# Patient Record
Sex: FEMALE | Race: WHITE | NOT HISPANIC OR LATINO | Employment: FULL TIME | URBAN - METROPOLITAN AREA
[De-identification: names, ages, dates, MRNs, and addresses within clinical notes are randomized per-mention and may not be internally consistent; named-entity substitution may affect disease eponyms.]

---

## 2018-01-17 ENCOUNTER — OFFICE VISIT (OUTPATIENT)
Dept: URGENT CARE | Facility: PHYSICIAN GROUP | Age: 53
End: 2018-01-17
Payer: COMMERCIAL

## 2018-01-17 ENCOUNTER — HOSPITAL ENCOUNTER (OUTPATIENT)
Dept: RADIOLOGY | Facility: MEDICAL CENTER | Age: 53
End: 2018-01-17
Attending: FAMILY MEDICINE
Payer: COMMERCIAL

## 2018-01-17 VITALS
SYSTOLIC BLOOD PRESSURE: 132 MMHG | RESPIRATION RATE: 18 BRPM | BODY MASS INDEX: 35.67 KG/M2 | OXYGEN SATURATION: 99 % | HEART RATE: 84 BPM | TEMPERATURE: 98.6 F | DIASTOLIC BLOOD PRESSURE: 100 MMHG | WEIGHT: 195 LBS

## 2018-01-17 DIAGNOSIS — E11.9 DIABETES MELLITUS TYPE 2 IN NONOBESE (HCC): ICD-10-CM

## 2018-01-17 DIAGNOSIS — I10 ESSENTIAL HYPERTENSION: ICD-10-CM

## 2018-01-17 DIAGNOSIS — M53.9 MULTILEVEL DEGENERATIVE DISC DISEASE: ICD-10-CM

## 2018-01-17 DIAGNOSIS — M54.12 RADICULITIS, CERVICAL: ICD-10-CM

## 2018-01-17 PROCEDURE — 72040 X-RAY EXAM NECK SPINE 2-3 VW: CPT

## 2018-01-17 PROCEDURE — 99204 OFFICE O/P NEW MOD 45 MIN: CPT | Performed by: FAMILY MEDICINE

## 2018-01-17 RX ORDER — HYDROCODONE BITARTRATE AND ACETAMINOPHEN 5; 325 MG/1; MG/1
1 TABLET ORAL EVERY 8 HOURS PRN
Qty: 15 TAB | Refills: 0 | Status: SHIPPED | OUTPATIENT
Start: 2018-01-17 | End: 2018-01-22

## 2018-01-17 RX ORDER — METHYLPREDNISOLONE 4 MG/1
TABLET ORAL
Qty: 21 TAB | Refills: 0 | Status: SHIPPED | OUTPATIENT
Start: 2018-01-17 | End: 2019-10-05

## 2018-01-17 ASSESSMENT — PAIN SCALES - GENERAL: PAINLEVEL: 9=SEVERE PAIN

## 2018-01-18 NOTE — PROGRESS NOTES
Subjective:      Rosalba Méndez is a 53 y.o. female who presents with Other (nerve pain radiating on Rt side x 4 days )    Very pleasant patient presents to urgent care with acute onset of 4 days right sided neck pain that radiates down to her right shoulder region she feels numbness in her right thumb. Denies any trauma to the area. States that she is a  is right handed. He often holds her hand and arm in a stressed position. She also adds that she has had some increase activity and stress at her house. May not be sleeping as well as she normally does. Denies any fevers or chills. No skin rashes. She's been taking some ibuprofen with minimal relief in symptoms.    She has a history of diabetes and hypertension which is currently stable per patient.  She denies history of diabetic neuropathy.    HPI  ROS  PMH:  has a past medical history of Diabetes and Hypertension.  MEDS:   Current Outpatient Prescriptions:   •  ampicillin (PRINCIPEN) 500 MG CAPS, Take 1 Cap by mouth every 6 hours., Disp: 40 Cap, Rfl: 0  •  ibuprofen (MOTRIN) 800 MG TABS, Take 800 mg by mouth every 8 hours as needed., Disp: , Rfl:   •  hydrocodone-acetaminophen (NORCO) 5-325 MG TABS per tablet, Take 1 Tab by mouth every 6 hours as needed., Disp: 20 Tab, Rfl: 0  •  METFORMIN HCL PO, Take  by mouth., Disp: , Rfl:   •  NON SPECIFIED, HTN (lisinopril), Disp: , Rfl:   ALLERGIES: No Known Allergies  SURGHX:   Past Surgical History:   Procedure Laterality Date   • OTHER ABDOMINAL SURGERY      cholecystectomy     SOCHX:  reports that she has never smoked. She has never used smokeless tobacco. She reports that she does not drink alcohol or use drugs.  FH: Family history was reviewed, no pertinent findings to report     Objective:     /100   Pulse 84   Temp 37 °C (98.6 °F)   Resp 18   Wt 88.5 kg (195 lb)   SpO2 99%   BMI 35.67 kg/m²      Physical Exam   Constitutional: She is oriented to person, place, and time. She appears  well-developed and well-nourished. No distress.   HENT:   Mouth/Throat: Oropharynx is clear and moist.   Eyes: Conjunctivae are normal.   Neck: Normal range of motion.   Cardiovascular: Normal rate.    Pulmonary/Chest: Effort normal.   Musculoskeletal: Normal range of motion. She exhibits tenderness. She exhibits no edema or deformity.        Cervical back: She exhibits tenderness, pain and spasm. She exhibits normal range of motion, no bony tenderness and normal pulse.        Back:         Arms:  Lymphadenopathy:     She has no cervical adenopathy.   Neurological: She is alert and oriented to person, place, and time. She exhibits normal muscle tone.   Skin: Skin is warm and dry. She is not diaphoretic. No erythema.   Psychiatric: She has a normal mood and affect. Her behavior is normal.       Diagnostics: C-spine x-ray multilevel degenerative disc      Assessment/Plan:     1. Radiculitis, cervical  DX-CERVICAL SPINE-2 OR 3 VIEWS    hydrocodone-acetaminophen (NORCO) 5-325 MG Tab per tablet    CONSENT FOR OPIATE PRESCRIPTION    MethylPREDNISolone (MEDROL DOSEPAK) 4 MG Tablet Therapy Pack   2. Multilevel degenerative disc disease  MethylPREDNISolone (MEDROL DOSEPAK) 4 MG Tablet Therapy Pack   3. Essential hypertension     4. Diabetes mellitus type 2 in nonobese (CMS-[facility])     Continue chronic medications these issues are stable per patient  Follow-up with primary care provider within 4-5 days, emergency room precautions discussed.  Patient and/or family appears understanding of information.      Follow-up with primary care provider within 4-5 days, emergency room precautions discussed.  Patient and/or family appears understanding of information.

## 2018-04-23 ENCOUNTER — OFFICE VISIT (OUTPATIENT)
Dept: URGENT CARE | Facility: PHYSICIAN GROUP | Age: 53
End: 2018-04-23
Payer: COMMERCIAL

## 2018-04-23 VITALS
WEIGHT: 195 LBS | HEIGHT: 63 IN | RESPIRATION RATE: 14 BRPM | HEART RATE: 84 BPM | BODY MASS INDEX: 34.55 KG/M2 | TEMPERATURE: 99.3 F | SYSTOLIC BLOOD PRESSURE: 134 MMHG | OXYGEN SATURATION: 95 % | DIASTOLIC BLOOD PRESSURE: 80 MMHG

## 2018-04-23 DIAGNOSIS — J10.1 INFLUENZA B: ICD-10-CM

## 2018-04-23 LAB
FLUAV+FLUBV AG SPEC QL IA: POSITIVE
INT CON NEG: NEGATIVE
INT CON POS: POSITIVE

## 2018-04-23 PROCEDURE — 87804 INFLUENZA ASSAY W/OPTIC: CPT | Performed by: FAMILY MEDICINE

## 2018-04-23 PROCEDURE — 99214 OFFICE O/P EST MOD 30 MIN: CPT | Performed by: FAMILY MEDICINE

## 2018-04-23 RX ORDER — LISINOPRIL 10 MG/1
10 TABLET ORAL DAILY
COMMUNITY
End: 2020-10-16 | Stop reason: SDUPTHER

## 2018-04-23 RX ORDER — OSELTAMIVIR PHOSPHATE 75 MG/1
75 CAPSULE ORAL 2 TIMES DAILY
Qty: 10 CAP | Refills: 0 | Status: SHIPPED | OUTPATIENT
Start: 2018-04-23 | End: 2019-10-05

## 2018-04-23 ASSESSMENT — PAIN SCALES - GENERAL: PAINLEVEL: NO PAIN

## 2018-04-30 ASSESSMENT — ENCOUNTER SYMPTOMS
FEVER: 1
CHILLS: 1
MYALGIAS: 1
SWOLLEN GLANDS: 1

## 2018-05-01 NOTE — PROGRESS NOTES
"Subjective:   Rosalba Méndez is a 53 y.o. female who presents for Flu Like Symptoms (x 3 days)        Influenza   This is a new problem. The current episode started in the past 7 days. The problem occurs constantly. The problem has been rapidly worsening. Associated symptoms include chills, a fever, myalgias and swollen glands.     Review of Systems   Constitutional: Positive for chills and fever.   Musculoskeletal: Positive for myalgias.     No Known Allergies   Objective:   /80   Pulse 84   Temp 37.4 °C (99.3 °F)   Resp 14   Ht 1.6 m (5' 3\")   Wt 88.5 kg (195 lb)   SpO2 95%   BMI 34.54 kg/m²   Physical Exam   Constitutional: She is oriented to person, place, and time. She appears well-developed and well-nourished. No distress.   HENT:   Head: Normocephalic and atraumatic.   Right Ear: External ear normal.   Left Ear: External ear normal.   Nose: Mucosal edema and rhinorrhea present. No sinus tenderness. Right sinus exhibits no maxillary sinus tenderness and no frontal sinus tenderness. Left sinus exhibits no maxillary sinus tenderness and no frontal sinus tenderness.   Mouth/Throat: Uvula is midline. Posterior oropharyngeal edema and posterior oropharyngeal erythema present. No oropharyngeal exudate or tonsillar abscesses.   Eyes: Conjunctivae and EOM are normal. Pupils are equal, round, and reactive to light.   Cardiovascular: Normal rate, regular rhythm, normal heart sounds and intact distal pulses.    No murmur heard.  Pulmonary/Chest: Effort normal and breath sounds normal. No respiratory distress.   Abdominal: Soft. Bowel sounds are normal. She exhibits no distension. There is no tenderness.   Musculoskeletal: Normal range of motion.   Neurological: She is alert and oriented to person, place, and time. She has normal reflexes. No sensory deficit.   Skin: Skin is warm and dry.   Psychiatric: She has a normal mood and affect. Her behavior is normal.         Assessment/Plan:   Assessment    1. " Influenza B  - oseltamivir (TAMIFLU) 75 MG Cap; Take 1 Cap by mouth 2 times a day.  Dispense: 10 Cap; Refill: 0  - POCT Influenza A/B  Differential diagnosis, natural history, supportive care, and indications for immediate follow-up discussed.

## 2018-06-24 ENCOUNTER — OFFICE VISIT (OUTPATIENT)
Dept: URGENT CARE | Facility: PHYSICIAN GROUP | Age: 53
End: 2018-06-24
Payer: COMMERCIAL

## 2018-06-24 VITALS
SYSTOLIC BLOOD PRESSURE: 142 MMHG | HEIGHT: 63 IN | HEART RATE: 100 BPM | DIASTOLIC BLOOD PRESSURE: 86 MMHG | OXYGEN SATURATION: 97 % | TEMPERATURE: 98.9 F | WEIGHT: 192 LBS | RESPIRATION RATE: 16 BRPM | BODY MASS INDEX: 34.02 KG/M2

## 2018-06-24 DIAGNOSIS — R30.0 DYSURIA: ICD-10-CM

## 2018-06-24 DIAGNOSIS — R11.0 NAUSEA: ICD-10-CM

## 2018-06-24 LAB
APPEARANCE UR: ABNORMAL
BILIRUB UR STRIP-MCNC: ABNORMAL MG/DL
COLOR UR AUTO: ABNORMAL
GLUCOSE UR STRIP.AUTO-MCNC: NEGATIVE MG/DL
KETONES UR STRIP.AUTO-MCNC: NEGATIVE MG/DL
LEUKOCYTE ESTERASE UR QL STRIP.AUTO: NEGATIVE
NITRITE UR QL STRIP.AUTO: NEGATIVE
PH UR STRIP.AUTO: 6 [PH] (ref 5–8)
PROT UR QL STRIP: 100 MG/DL
RBC UR QL AUTO: ABNORMAL
SP GR UR STRIP.AUTO: 1.03
UROBILINOGEN UR STRIP-MCNC: NEGATIVE MG/DL

## 2018-06-24 PROCEDURE — 81002 URINALYSIS NONAUTO W/O SCOPE: CPT | Performed by: FAMILY MEDICINE

## 2018-06-24 PROCEDURE — 99214 OFFICE O/P EST MOD 30 MIN: CPT | Performed by: FAMILY MEDICINE

## 2018-06-24 RX ORDER — ONDANSETRON 4 MG/1
4 TABLET, ORALLY DISINTEGRATING ORAL EVERY 8 HOURS PRN
Qty: 10 TAB | Refills: 0 | Status: SHIPPED | OUTPATIENT
Start: 2018-06-24 | End: 2019-10-05

## 2018-06-28 ASSESSMENT — ENCOUNTER SYMPTOMS
MYALGIAS: 0
EYE DISCHARGE: 0
CHILLS: 0
EYE REDNESS: 0
FEVER: 0

## 2018-06-28 NOTE — PROGRESS NOTES
"Subjective:      Rosalba Méndez is a 53 y.o. female who presents with Urinary Pain (C/o dysuria x2 wks)            2 week urinary burning and urgency. Today with nausea/vomiting and concern for UTI. No fever. No abdominal pain. No diarrhea. No recent travel. No hematuria or flank pain. Sx's are moderate severity. No OTC medications. No other aggravating or alleviating factors.          Review of Systems   Constitutional: Negative for chills and fever.   Eyes: Negative for discharge and redness.   Musculoskeletal: Negative for joint pain and myalgias.   Skin: Negative for itching and rash.          Objective:     /86   Pulse 100   Temp 37.2 °C (98.9 °F)   Resp 16   Ht 1.6 m (5' 3\")   Wt 87.1 kg (192 lb)   SpO2 97%   BMI 34.01 kg/m²      Physical Exam   Constitutional: She appears well-developed and well-nourished. No distress.   HENT:   Head: Normocephalic and atraumatic.   Cardiovascular: Normal rate, regular rhythm and normal heart sounds.    No murmur heard.  Pulmonary/Chest: Effort normal and breath sounds normal.   Abdominal: Soft. Bowel sounds are normal. There is no tenderness.   Genitourinary:   Genitourinary Comments: No cvat   Neurological:   Speech is clear. Patient is appropriate and cooperative.     Skin: Skin is warm and dry. No rash noted.               Assessment/Plan:   UA unremarkable    1. Nausea  ondansetron (ZOFRAN ODT) 4 MG TABLET DISPERSIBLE    POCT Urinalysis   2. Dysuria       Differential diagnosis, natural history, supportive care, and indications for immediate follow-up discussed at length.         "

## 2019-03-28 ENCOUNTER — OFFICE VISIT (OUTPATIENT)
Dept: URGENT CARE | Facility: PHYSICIAN GROUP | Age: 54
End: 2019-03-28
Payer: COMMERCIAL

## 2019-03-28 VITALS
DIASTOLIC BLOOD PRESSURE: 68 MMHG | HEIGHT: 63 IN | SYSTOLIC BLOOD PRESSURE: 130 MMHG | OXYGEN SATURATION: 96 % | RESPIRATION RATE: 14 BRPM | BODY MASS INDEX: 34.55 KG/M2 | WEIGHT: 195 LBS | HEART RATE: 93 BPM | TEMPERATURE: 98.1 F

## 2019-03-28 DIAGNOSIS — H60.392 OTHER INFECTIVE ACUTE OTITIS EXTERNA OF LEFT EAR: ICD-10-CM

## 2019-03-28 PROCEDURE — 99214 OFFICE O/P EST MOD 30 MIN: CPT | Performed by: FAMILY MEDICINE

## 2019-03-28 RX ORDER — BENZONATATE 200 MG/1
200 CAPSULE ORAL 3 TIMES DAILY PRN
Qty: 45 CAP | Refills: 0 | Status: SHIPPED | OUTPATIENT
Start: 2019-03-28 | End: 2019-10-05

## 2019-03-28 RX ORDER — CIPROFLOXACIN AND DEXAMETHASONE 3; 1 MG/ML; MG/ML
4 SUSPENSION/ DROPS AURICULAR (OTIC) 2 TIMES DAILY
Qty: 1 BOTTLE | Refills: 0 | Status: SHIPPED | OUTPATIENT
Start: 2019-03-28 | End: 2019-10-05

## 2019-03-28 RX ORDER — FLUTICASONE PROPIONATE 50 MCG
1 SPRAY, SUSPENSION (ML) NASAL DAILY
Qty: 1 BOTTLE | Refills: 0 | Status: SHIPPED | OUTPATIENT
Start: 2019-03-28 | End: 2019-10-05

## 2019-03-28 NOTE — PROGRESS NOTES
"Subjective:      CC: Otalgia -  ear pain            Otalgia - left  ear  This is a new problem. The current episode started yesterday. The problem occurs constantly. The problem has been unchanged. Associated symptoms include: productive cough, nasal congestion, serous discharge. Pertinent negatives include no abdominal pain, chest pain, chills, fever, headaches, joint swelling, myalgias, nausea, neck pain, rash or visual change. Nothing aggravates the symptoms. She has tried nothing for the symptoms.     Social hx - denies tobacco, alcohol, drug use      Past Medical History:   Diagnosis Date   • Diabetes    • Hypertension        Review of Systems   Constitutional: Negative for fever and chills.   HENT:   Negative for hearing loss and tinnitus.    Respiratory: + cough. Negative for hemoptysis, shortness of breath and wheezing.    Cardiovascular: Negative for chest pain, palpitations and leg swelling.   Gastrointestinal: Negative for nausea and abdominal pain.   Musculoskeletal: Negative for myalgias, joint swelling and neck pain.   Skin: Negative for rash.   Neurological: Negative for headaches.   All other systems reviewed and are negative.         Objective:     Blood pressure 130/68, pulse 93, temperature 36.7 °C (98.1 °F), temperature source Temporal, resp. rate 14, height 1.6 m (5' 3\"), weight 88.5 kg (195 lb), SpO2 96 %.      Physical Exam   Constitutional: Vital signs are normal. She is active. No distress.   HENT:   Head: There is normal jaw occlusion.   Left Ear:  There is pain upon palpation of the tragus.  There is erythema, edema, and narrowing of the external auditory canal.    No discharge noted  Left Ear: External ear normal. Tympanic membrane is normal  Nose:  Boggy mucosa.   + postnasal drip noted.   Mouth/Throat: Mucous membranes are moist. No oral lesions.  No erythema. No oropharyngeal exudate, pharynx swelling or pharynx petechiae. No  exudate.   Eyes: Conjunctivae and EOM are normal. Pupils " are equal, round, and reactive to light. Right eye exhibits no discharge. Left eye exhibits no discharge.   Neck: Normal range of motion. Neck supple.  No adenopathy  Cardiovascular: Normal rate and regular rhythm.  Pulses are palpable.    No murmur heard.  Pulmonary/Chest: Effort normal and breath sounds normal. There is normal air entry. No respiratory distress. no wheezes, rhonchi,  retraction.   Musculoskeletal:   no edema.   Neurological: A/O x 3.   CN 2-12 intact   Skin: Skin is warm. Capillary refill takes less than 3 seconds. No purpura and no rash noted. Patient is not diaphoretic. No jaundice or pallor.   Nursing note and vitals reviewed.              Assessment/Plan:       1. Other infective acute otitis externa of left ear     - ciprofloxacin/dexamethasone (CIPRODEX) 0.3-0.1 % Suspension; Place 4 Drops in ear 2 times a day.  Dispense: 1 Bottle; Refill: 0  - fluticasone (FLONASE) 50 MCG/ACT nasal spray; Spray 1 Spray in nose every day.  Dispense: 1 Bottle; Refill: 0  - benzonatate (TESSALON) 200 MG capsule; Take 1 Cap by mouth 3 times a day as needed for Cough.  Dispense: 45 Cap; Refill: 0      Follow up in one week if no improvement, sooner if symptoms worsen.

## 2019-03-29 ENCOUNTER — TELEPHONE (OUTPATIENT)
Dept: URGENT CARE | Facility: PHYSICIAN GROUP | Age: 54
End: 2019-03-29

## 2019-03-29 NOTE — TELEPHONE ENCOUNTER
----- Message from Deepa Casas, Med Ass't sent at 3/29/2019  8:52 AM PDT -----  Regarding: Telephone Encounter  Contact: [phone]  You saw PT yesterday 3/28 and prescribed her ciprofloxacin/dexamethasone (CIPRODEX) 0.3-0.1 % Suspension    Her insurance needs more information in order to approve the medication. Please call her at your earliest convenience as her condition has worsened.  Thank you

## 2019-10-05 ENCOUNTER — HOSPITAL ENCOUNTER (EMERGENCY)
Facility: MEDICAL CENTER | Age: 54
End: 2019-10-05
Attending: EMERGENCY MEDICINE
Payer: COMMERCIAL

## 2019-10-05 ENCOUNTER — APPOINTMENT (OUTPATIENT)
Dept: RADIOLOGY | Facility: MEDICAL CENTER | Age: 54
End: 2019-10-05
Attending: EMERGENCY MEDICINE
Payer: COMMERCIAL

## 2019-10-05 VITALS
HEIGHT: 63 IN | SYSTOLIC BLOOD PRESSURE: 140 MMHG | OXYGEN SATURATION: 96 % | WEIGHT: 196.21 LBS | RESPIRATION RATE: 19 BRPM | HEART RATE: 60 BPM | DIASTOLIC BLOOD PRESSURE: 71 MMHG | TEMPERATURE: 98.2 F | BODY MASS INDEX: 34.77 KG/M2

## 2019-10-05 DIAGNOSIS — N20.0 KIDNEY STONE: ICD-10-CM

## 2019-10-05 LAB
ALBUMIN SERPL BCP-MCNC: 4.2 G/DL (ref 3.2–4.9)
ALBUMIN/GLOB SERPL: 1.3 G/DL
ALP SERPL-CCNC: 91 U/L (ref 30–99)
ALT SERPL-CCNC: 20 U/L (ref 2–50)
ANION GAP SERPL CALC-SCNC: 16 MMOL/L (ref 0–11.9)
APPEARANCE UR: ABNORMAL
AST SERPL-CCNC: 15 U/L (ref 12–45)
BACTERIA #/AREA URNS HPF: ABNORMAL /HPF
BASOPHILS # BLD AUTO: 0.7 % (ref 0–1.8)
BASOPHILS # BLD: 0.06 K/UL (ref 0–0.12)
BILIRUB SERPL-MCNC: 0.2 MG/DL (ref 0.1–1.5)
BILIRUB UR QL STRIP.AUTO: NEGATIVE
BUN SERPL-MCNC: 14 MG/DL (ref 8–22)
CALCIUM SERPL-MCNC: 9.3 MG/DL (ref 8.4–10.2)
CHLORIDE SERPL-SCNC: 101 MMOL/L (ref 96–112)
CO2 SERPL-SCNC: 21 MMOL/L (ref 20–33)
COLOR UR: YELLOW
CREAT SERPL-MCNC: 0.75 MG/DL (ref 0.5–1.4)
EOSINOPHIL # BLD AUTO: 0.23 K/UL (ref 0–0.51)
EOSINOPHIL NFR BLD: 2.8 % (ref 0–6.9)
EPI CELLS #/AREA URNS HPF: ABNORMAL /HPF
ERYTHROCYTE [DISTWIDTH] IN BLOOD BY AUTOMATED COUNT: 37.2 FL (ref 35.9–50)
GLOBULIN SER CALC-MCNC: 3.3 G/DL (ref 1.9–3.5)
GLUCOSE SERPL-MCNC: 419 MG/DL (ref 65–99)
GLUCOSE UR STRIP.AUTO-MCNC: >=1000 MG/DL
HCG SERPL QL: NEGATIVE
HCT VFR BLD AUTO: 44 % (ref 37–47)
HGB BLD-MCNC: 14.2 G/DL (ref 12–16)
IMM GRANULOCYTES # BLD AUTO: 0.05 K/UL (ref 0–0.11)
IMM GRANULOCYTES NFR BLD AUTO: 0.6 % (ref 0–0.9)
KETONES UR STRIP.AUTO-MCNC: NEGATIVE MG/DL
LEUKOCYTE ESTERASE UR QL STRIP.AUTO: NEGATIVE
LIPASE SERPL-CCNC: 41 U/L (ref 7–58)
LYMPHOCYTES # BLD AUTO: 1.36 K/UL (ref 1–4.8)
LYMPHOCYTES NFR BLD: 16.3 % (ref 22–41)
MCH RBC QN AUTO: 26.6 PG (ref 27–33)
MCHC RBC AUTO-ENTMCNC: 32.3 G/DL (ref 33.6–35)
MCV RBC AUTO: 82.4 FL (ref 81.4–97.8)
MICRO URNS: ABNORMAL
MONOCYTES # BLD AUTO: 0.4 K/UL (ref 0–0.85)
MONOCYTES NFR BLD AUTO: 4.8 % (ref 0–13.4)
NEUTROPHILS # BLD AUTO: 6.25 K/UL (ref 2–7.15)
NEUTROPHILS NFR BLD: 74.8 % (ref 44–72)
NITRITE UR QL STRIP.AUTO: NEGATIVE
NRBC # BLD AUTO: 0 K/UL
NRBC BLD-RTO: 0 /100 WBC
PH UR STRIP.AUTO: 5 [PH] (ref 5–8)
PLATELET # BLD AUTO: 212 K/UL (ref 164–446)
PMV BLD AUTO: 10.6 FL (ref 9–12.9)
POTASSIUM SERPL-SCNC: 4.4 MMOL/L (ref 3.6–5.5)
PROT SERPL-MCNC: 7.5 G/DL (ref 6–8.2)
PROT UR QL STRIP: NEGATIVE MG/DL
RBC # BLD AUTO: 5.34 M/UL (ref 4.2–5.4)
RBC # URNS HPF: >150 /HPF
RBC UR QL AUTO: ABNORMAL
SODIUM SERPL-SCNC: 138 MMOL/L (ref 135–145)
SP GR UR STRIP.AUTO: 1.01
WBC # BLD AUTO: 8.4 K/UL (ref 4.8–10.8)
WBC #/AREA URNS HPF: ABNORMAL /HPF

## 2019-10-05 PROCEDURE — 85025 COMPLETE CBC W/AUTO DIFF WBC: CPT

## 2019-10-05 PROCEDURE — 83690 ASSAY OF LIPASE: CPT

## 2019-10-05 PROCEDURE — 81001 URINALYSIS AUTO W/SCOPE: CPT

## 2019-10-05 PROCEDURE — 700111 HCHG RX REV CODE 636 W/ 250 OVERRIDE (IP): Performed by: EMERGENCY MEDICINE

## 2019-10-05 PROCEDURE — 99285 EMERGENCY DEPT VISIT HI MDM: CPT

## 2019-10-05 PROCEDURE — 36415 COLL VENOUS BLD VENIPUNCTURE: CPT

## 2019-10-05 PROCEDURE — 80053 COMPREHEN METABOLIC PANEL: CPT

## 2019-10-05 PROCEDURE — 74176 CT ABD & PELVIS W/O CONTRAST: CPT

## 2019-10-05 PROCEDURE — 96361 HYDRATE IV INFUSION ADD-ON: CPT

## 2019-10-05 PROCEDURE — 84703 CHORIONIC GONADOTROPIN ASSAY: CPT

## 2019-10-05 PROCEDURE — 96374 THER/PROPH/DIAG INJ IV PUSH: CPT

## 2019-10-05 PROCEDURE — 700105 HCHG RX REV CODE 258: Performed by: EMERGENCY MEDICINE

## 2019-10-05 PROCEDURE — 96375 TX/PRO/DX INJ NEW DRUG ADDON: CPT

## 2019-10-05 RX ORDER — KETOROLAC TROMETHAMINE 30 MG/ML
15 INJECTION, SOLUTION INTRAMUSCULAR; INTRAVENOUS ONCE
Status: COMPLETED | OUTPATIENT
Start: 2019-10-05 | End: 2019-10-05

## 2019-10-05 RX ORDER — ONDANSETRON 4 MG/1
4 TABLET, ORALLY DISINTEGRATING ORAL EVERY 6 HOURS PRN
Qty: 20 TAB | Refills: 0 | Status: SHIPPED | OUTPATIENT
Start: 2019-10-05 | End: 2020-02-28

## 2019-10-05 RX ORDER — ONDANSETRON 2 MG/ML
4 INJECTION INTRAMUSCULAR; INTRAVENOUS ONCE
Status: COMPLETED | OUTPATIENT
Start: 2019-10-05 | End: 2019-10-05

## 2019-10-05 RX ORDER — ACETAMINOPHEN 500 MG
1000 TABLET ORAL DAILY
COMMUNITY

## 2019-10-05 RX ORDER — GLIMEPIRIDE 4 MG/1
4 TABLET ORAL DAILY
Refills: 1 | Status: SHIPPED | COMMUNITY
Start: 2019-07-29 | End: 2020-01-16

## 2019-10-05 RX ORDER — HYDROCODONE BITARTRATE AND ACETAMINOPHEN 5; 325 MG/1; MG/1
1-2 TABLET ORAL EVERY 6 HOURS PRN
Qty: 15 TAB | Refills: 0 | Status: SHIPPED | OUTPATIENT
Start: 2019-10-05 | End: 2019-10-08

## 2019-10-05 RX ORDER — SODIUM CHLORIDE, SODIUM LACTATE, POTASSIUM CHLORIDE, CALCIUM CHLORIDE 600; 310; 30; 20 MG/100ML; MG/100ML; MG/100ML; MG/100ML
1000 INJECTION, SOLUTION INTRAVENOUS ONCE
Status: COMPLETED | OUTPATIENT
Start: 2019-10-05 | End: 2019-10-05

## 2019-10-05 RX ORDER — IBUPROFEN 600 MG/1
600 TABLET ORAL EVERY 6 HOURS PRN
Qty: 30 TAB | Refills: 0 | Status: SHIPPED | OUTPATIENT
Start: 2019-10-05 | End: 2020-02-28

## 2019-10-05 RX ADMIN — ONDANSETRON 4 MG: 2 INJECTION INTRAMUSCULAR; INTRAVENOUS at 10:57

## 2019-10-05 RX ADMIN — KETOROLAC TROMETHAMINE 15 MG: 30 INJECTION, SOLUTION INTRAMUSCULAR at 10:57

## 2019-10-05 RX ADMIN — SODIUM CHLORIDE, POTASSIUM CHLORIDE, SODIUM LACTATE AND CALCIUM CHLORIDE 1000 ML: 600; 310; 30; 20 INJECTION, SOLUTION INTRAVENOUS at 10:56

## 2019-10-05 ASSESSMENT — PAIN DESCRIPTION - DESCRIPTORS: DESCRIPTORS: ACHING

## 2019-10-05 ASSESSMENT — PAIN SCALES - WONG BAKER: WONGBAKER_NUMERICALRESPONSE: HURTS AS MUCH AS POSSIBLE

## 2019-10-05 NOTE — ED NOTES
Rounded on patient.  Up to restroom and back.  Pain improved to a tolerable level.  No additional needs at this time.

## 2019-10-05 NOTE — ED NOTES
Discharge information reviewed in detail. Patient verbalized understanding of discharge instructions to follow up with Torito Boyd and to return to ER if condition worsens.  Patient verbalized understanding of the Controlled Substances Use Informed Consent and signed.  Patient educated/expressed awareness of not driving or operating heavy machinery.   Patient educated no combining of alcohol with other sedating medications.   Patient educated on three new prescription(s). Daughter to drive home.   Patient ambulated out of ER in a steady gait.

## 2019-10-05 NOTE — ED TRIAGE NOTES
"Chief Complaint   Patient presents with   • Flank Pain     started 0630 this morning with nausea and diarrhea. hx of kidney stones.      BP (!) 195/104 Comment: RN notified.  Pulse 70   Temp 36.7 °C (98 °F) (Temporal)   Resp 19   Ht 1.6 m (5' 3\")   Wt 89 kg (196 lb 3.4 oz)   SpO2 97%   BMI 34.76 kg/m²       "

## 2019-10-05 NOTE — ED PROVIDER NOTES
ED Provider Note      ER PROVIDER NOTE    CHIEF COMPLAINT  Chief Complaint   Patient presents with   • Flank Pain     started 0630 this morning with nausea and diarrhea. hx of kidney stones.        HPI  Rosalba Méndez is a 54 y.o. female who presents to the emergency department complaining of right-sided flank pain.  Patient reports she woke this morning, with abrupt onset of the pain around 630.  It is sharp, does not seem to come in waves, with some radiation to the front.  No real alleviating or aggravating factors.  She states she has some associated nausea although no vomiting at this time, no fevers or chills.  No other abdominal pain.  She states some dark urine although no known hematuria or dysuria.  No chest pain or shortness of breath.    She does have history of kidney stones    REVIEW OF SYSTEMS  Pertinent positives include flank pain. Pertinent negatives include no fever. See HPI for details. All other systems reviewed and are negative.    PAST MEDICAL HISTORY   has a past medical history of Diabetes and Hypertension.    SURGICAL HISTORY   has a past surgical history that includes other abdominal surgery.    FAMILY HISTORY  History reviewed. No pertinent family history.    SOCIAL HISTORY  Social History     Socioeconomic History   • Marital status:      Spouse name: Not on file   • Number of children: Not on file   • Years of education: Not on file   • Highest education level: Not on file   Occupational History   • Not on file   Social Needs   • Financial resource strain: Not on file   • Food insecurity:     Worry: Not on file     Inability: Not on file   • Transportation needs:     Medical: Not on file     Non-medical: Not on file   Tobacco Use   • Smoking status: Never Smoker   • Smokeless tobacco: Never Used   Substance and Sexual Activity   • Alcohol use: No   • Drug use: No   • Sexual activity: Not on file   Lifestyle   • Physical activity:     Days per week: Not on file     Minutes per  "session: Not on file   • Stress: Not on file   Relationships   • Social connections:     Talks on phone: Not on file     Gets together: Not on file     Attends [employer] service: Not on file     Active member of club or organization: Not on file     Attends meetings of clubs or organizations: Not on file     Relationship status: Not on file   • Intimate partner violence:     Fear of current or ex partner: Not on file     Emotionally abused: Not on file     Physically abused: Not on file     Forced sexual activity: Not on file   Other Topics Concern   • Not on file   Social History Narrative   • Not on file      Social History     Substance and Sexual Activity   Drug Use No       CURRENT MEDICATIONS  Home Medications     Reviewed by Paula Kwan (Pharmacy Tech) on 10/05/19 at 1016  Med List Status: Complete   Medication Last Dose Status   acetaminophen (TYLENOL) 500 MG Tab 10/4/2019 Active   glimepiride (AMARYL) 4 MG Tab 10/4/2019 Active   ibuprofen (MOTRIN) 600 MG Tab FEW DAYS AGO Active   lisinopril (PRINIVIL) 10 MG Tab 10/4/2019 Active   metformin (GLUCOPHAGE) 1000 MG tablet 10/4/2019 Active                ALLERGIES  No Known Allergies    PHYSICAL EXAM  VITAL SIGNS: /72   Pulse 64   Temp 36.7 °C (98 °F) (Temporal)   Resp 19   Ht 1.6 m (5' 3\")   Wt 89 kg (196 lb 3.4 oz)   SpO2 97%   BMI 34.76 kg/m²   Pulse ox interpretation: I interpret this pulse ox as normal.    Constitutional: Alert moderately uncomfortable  HENT: No signs of trauma, Bilateral external ears normal, Nose normal.  Mucous membranes dry  Eyes: Pupils are equal and reactive, Conjunctiva normal, Non-icteric.   Neck: Normal range of motion, No tenderness, Supple, No stridor.   Lymphatic: No lymphadenopathy noted.   Cardiovascular: Regular rate and rhythm, no murmurs.   Thorax & Lungs: Normal breath sounds, No respiratory distress, No wheezing, No chest tenderness.   Abdomen: Bowel sounds normal, Soft, No tenderness, No masses, No " pulsatile masses. No peritoneal signs.  Skin: Warm, Dry, No erythema, No rash.   Back: No bony tenderness, No CVA tenderness.   Extremities: Intact distal pulses, No edema, No tenderness, No cyanosis,   Musculoskeletal: Good range of motion in all major joints. No tenderness to palpation or major deformities noted.   Neurologic: Alert , Normal motor function, Normal sensory function, No focal deficits noted.   Psychiatric: Affect normal, Judgment normal, Mood normal.     DIAGNOSTIC STUDIES / PROCEDURES      LABS  Labs Reviewed   CBC WITH DIFFERENTIAL - Abnormal; Notable for the following components:       Result Value    MCH 26.6 (*)     MCHC 32.3 (*)     Neutrophils-Polys 74.80 (*)     Lymphocytes 16.30 (*)     All other components within normal limits   COMP METABOLIC PANEL - Abnormal; Notable for the following components:    Anion Gap 16.0 (*)     Glucose 419 (*)     All other components within normal limits   URINALYSIS,CULTURE IF INDICATED - Abnormal; Notable for the following components:    Character Cloudy (*)     Glucose >=1000 (*)     Occult Blood Large (*)     All other components within normal limits   URINE MICROSCOPIC (W/UA) - Abnormal; Notable for the following components:    RBC >150 (*)     Bacteria Few (*)     All other components within normal limits   LIPASE   HCG QUAL SERUM   ESTIMATED GFR       All labs reviewed by me.    RADIOLOGY  CT-RENAL COLIC EVALUATION(A/P W/O)   Final Result      1.  There is a 1-2 mm calcification at the right UVJ or possibly just having passed into the bladder.   2.  There is no hydronephrosis.   3.  There is no appendicitis.        The radiologist's interpretation of all radiological studies have been reviewed by me.    COURSE & MEDICAL DECISION MAKING  Nursing notes, VS, PMSFHx reviewed in chart.    9:44 AM Patient seen and examined at bedside. Patient will be treated with ketorolac Zofran and IV fluids. Ordered for CBC, CMP, urinalysis, CT to evaluate her symptoms.      11:15 AM patient reevaluated, states her pain has improved, still some dull pain so she just received her medications, nausea is improved.  Updated on results,    12:17 PM  Patient reevaluated, states she is much more comfortable, no more nausea, will plan for discharge      HYDRATION: Based on the patient's presentation of Acute Vomiting the patient was given IV fluids. IV Hydration was used because oral hydration was not as rapid as required. Upon recheck following hydration, the patient was improved.    Decision Making:  This is a 54 y.o. female presented with flank pain.  She is found to have a very small UVJ stone.  No findings to suggest complication, urinary tract infection or significant obstructive process with this.  Additionally her scan shows no evidence of appendicitis or other surgical interabdominal process.  She will be discharged with prescription for ibuprofen, Zofran, Norco, and follow-up with urology    I reviewed prescription monitoring program for patient's narcotic use before prescribing a scheduled drug.The patient will not drink alcohol nor drive with prescribed medications. The patient will return for new or worsening symptoms and is stable at the time of discharge.    The patient is referred to a primary physician for blood pressure management, diabetic screening, and for all other preventative health concerns.    In prescribing controlled substances to this patient, I certify that I have obtained and reviewed the medical history of Rosalba Méndez. I have also made a good sherry effort to obtain applicable records from other providers who have treated the patient and records did not demonstrate any increased risk of substance abuse that would prevent me from prescribing controlled substances.     I have conducted a physical exam and documented it. I have reviewed Ms. Méndez’s prescription history as maintained by the [place] Prescription Monitoring Program.     I have assessed the  patient’s risk for abuse, dependency, and addiction using the validated Opioid Risk Tool available at https://www.mdcalc.com/xbkhlx-fuho-wqbh-ort-narcotic-abuse.     Given the above, I believe the benefits of controlled substance therapy outweigh the risks. The reasons for prescribing controlled substances include nonnarcotic medications have been inadequate for pain control. Accordingly, I have discussed the risk and benefits, treatment plan, and alternative therapies with the patient.         DISPOSITION:  Patient will be discharged home in stable condition.    FOLLOW UP:  Rudolph Bradford M.D.  [address] [facility] [address]  [phone]    In 1 week        OUTPATIENT MEDICATIONS:  New Prescriptions    HYDROCODONE-ACETAMINOPHEN (NORCO) 5-325 MG TAB PER TABLET    Take 1-2 Tabs by mouth every 6 hours as needed (pain) for up to 3 days.    IBUPROFEN (MOTRIN) 600 MG TAB    Take 1 Tab by mouth every 6 hours as needed for Moderate Pain.    ONDANSETRON (ZOFRAN ODT) 4 MG TABLET DISPERSIBLE    Take 1 Tab by mouth every 6 hours as needed for Nausea.       FINAL IMPRESSION  1. Kidney stone      The note accurately reflects work and decisions made by me.  Michael Middleton  10/5/2019  12:20 PM

## 2019-11-21 ENCOUNTER — OFFICE VISIT (OUTPATIENT)
Dept: URGENT CARE | Facility: PHYSICIAN GROUP | Age: 54
End: 2019-11-21
Payer: COMMERCIAL

## 2019-11-21 VITALS
OXYGEN SATURATION: 97 % | TEMPERATURE: 98.9 F | WEIGHT: 194 LBS | BODY MASS INDEX: 34.38 KG/M2 | DIASTOLIC BLOOD PRESSURE: 90 MMHG | HEIGHT: 63 IN | SYSTOLIC BLOOD PRESSURE: 105 MMHG | HEART RATE: 75 BPM | RESPIRATION RATE: 17 BRPM

## 2019-11-21 DIAGNOSIS — J01.40 ACUTE PANSINUSITIS, RECURRENCE NOT SPECIFIED: ICD-10-CM

## 2019-11-21 PROCEDURE — 99214 OFFICE O/P EST MOD 30 MIN: CPT | Performed by: NURSE PRACTITIONER

## 2019-11-21 RX ORDER — OMEPRAZOLE 40 MG/1
CAPSULE, DELAYED RELEASE ORAL
COMMUNITY
Start: 2019-11-14 | End: 2021-06-27

## 2019-11-21 RX ORDER — PREDNISONE 10 MG/1
10 TABLET ORAL DAILY
Qty: 5 TAB | Refills: 0 | Status: SHIPPED | OUTPATIENT
Start: 2019-11-21 | End: 2019-11-26

## 2019-11-21 RX ORDER — FLUTICASONE PROPIONATE 50 MCG
2 SPRAY, SUSPENSION (ML) NASAL DAILY
Qty: 1 BOTTLE | Refills: 0 | Status: SHIPPED | OUTPATIENT
Start: 2019-11-21 | End: 2020-07-10

## 2019-11-21 RX ORDER — INFLUENZA A VIRUS A/IDAHO/07/2018 (H1N1) ANTIGEN (MDCK CELL DERIVED, PROPIOLACTONE INACTIVATED, INFLUENZA A VIRUS A/INDIANA/08/2018 (H3N2) ANTIGEN (MDCK CELL DERIVED, PROPIOLACTONE INACTIVATED), INFLUENZA B VIRUS B/SINGAPORE/INFTT-16-0610/2016 ANTIGEN (MDCK CELL DERIVED, PROPIOLACTONE INACTIVATED), INFLUENZA B VIRUS B/IOWA/06/2017 ANTIGEN (MDCK CELL DERIVED, PROPIOLACTONE INACTIVATED) 15; 15; 15; 15 UG/.5ML; UG/.5ML; UG/.5ML; UG/.5ML
INJECTION, SUSPENSION INTRAMUSCULAR
Refills: 0 | COMMUNITY
Start: 2019-10-13 | End: 2021-03-05

## 2019-11-21 RX ORDER — AMOXICILLIN AND CLAVULANATE POTASSIUM 875; 125 MG/1; MG/1
1 TABLET, FILM COATED ORAL 2 TIMES DAILY
Qty: 14 TAB | Refills: 0 | Status: SHIPPED | OUTPATIENT
Start: 2019-11-21 | End: 2019-11-28

## 2019-11-22 ENCOUNTER — OFFICE VISIT (OUTPATIENT)
Dept: ENDOCRINOLOGY | Facility: MEDICAL CENTER | Age: 54
End: 2019-11-22
Payer: COMMERCIAL

## 2019-11-22 VITALS
HEART RATE: 66 BPM | DIASTOLIC BLOOD PRESSURE: 82 MMHG | WEIGHT: 194 LBS | HEIGHT: 63 IN | BODY MASS INDEX: 34.38 KG/M2 | SYSTOLIC BLOOD PRESSURE: 130 MMHG | OXYGEN SATURATION: 98 %

## 2019-11-22 DIAGNOSIS — E11.65 UNCONTROLLED TYPE 2 DIABETES MELLITUS WITH HYPERGLYCEMIA (HCC): ICD-10-CM

## 2019-11-22 DIAGNOSIS — I10 ESSENTIAL HYPERTENSION: ICD-10-CM

## 2019-11-22 LAB
HBA1C MFR BLD: 10.1 % (ref 0–5.6)
INT CON NEG: NEGATIVE
INT CON POS: POSITIVE

## 2019-11-22 PROCEDURE — 83036 HEMOGLOBIN GLYCOSYLATED A1C: CPT | Performed by: PHYSICIAN ASSISTANT

## 2019-11-22 PROCEDURE — 99204 OFFICE O/P NEW MOD 45 MIN: CPT | Performed by: PHYSICIAN ASSISTANT

## 2019-11-22 NOTE — PATIENT INSTRUCTIONS
Blood glucose log: Check BG in the morning when wake up,  and before bed.  So two times a day.  Always bring BG diary to the next office visit.       START:  1.  Steglatro  2.  Metformin  1000mg  3.  Trulicity 0.75 once a week and in 4 weeks increase to 1.5    Stop:  2.  Glimepiride

## 2019-11-22 NOTE — PROGRESS NOTES
Subjective:      Rosalba Méndez is a 54 y.o. female who presents with Runny Nose (Runny nose, Nose pain going into forhead x 4 days)            HPI  Nasal congestion, facial left side pressure, upper jaw pain. Clear runny nose now. Dry scratchy throat. No cough.     PMH:  has a past medical history of Diabetes and Hypertension.  MEDS:   Current Outpatient Medications:   •  omeprazole (PRILOSEC) 40 MG delayed-release capsule, , Disp: , Rfl:   •  glimepiride (AMARYL) 4 MG Tab, Take 4 mg by mouth every day., Disp: , Rfl: 1  •  metformin (GLUCOPHAGE) 1000 MG tablet, Take 1,000 mg by mouth 2 Times a Day., Disp: , Rfl: 3  •  acetaminophen (TYLENOL) 500 MG Tab, Take 500-1,000 mg by mouth every 6 hours as needed., Disp: , Rfl:   •  lisinopril (PRINIVIL) 10 MG Tab, Take 10 mg by mouth every day., Disp: , Rfl:   •  ibuprofen (MOTRIN) 600 MG Tab, Take 600 mg by mouth every 8 hours as needed., Disp: , Rfl:   •  FLUCELVAX QUADRIVALENT 0.5 ML Suspension Prefilled Syringe injection, ADM 0.5ML IM UTD, Disp: , Rfl: 0  •  ondansetron (ZOFRAN ODT) 4 MG TABLET DISPERSIBLE, Take 1 Tab by mouth every 6 hours as needed for Nausea. (Patient not taking: Reported on 11/21/2019), Disp: 20 Tab, Rfl: 0  •  ibuprofen (MOTRIN) 600 MG Tab, Take 1 Tab by mouth every 6 hours as needed for Moderate Pain. (Patient not taking: Reported on 11/21/2019), Disp: 30 Tab, Rfl: 0  ALLERGIES: No Known Allergies  SURGHX:   Past Surgical History:   Procedure Laterality Date   • OTHER ABDOMINAL SURGERY      cholecystectomy     SOCHX:  reports that she has never smoked. She has never used smokeless tobacco. She reports that she does not drink alcohol or use drugs.  FH: Family history was reviewed, no pertinent findings to report    Review of Systems   Constitutional: Positive for malaise/fatigue. Negative for chills and fever.   HENT: Positive for congestion, ear pain and sinus pain. Negative for sore throat.    Eyes: Negative for discharge and redness.  "  Respiratory: Negative for cough.    Gastrointestinal: Negative for abdominal pain, constipation, diarrhea, nausea and vomiting.   Musculoskeletal: Negative for myalgias and neck pain.   Skin: Negative for itching and rash.   Neurological: Positive for headaches. Negative for dizziness, tingling, sensory change, speech change, focal weakness and weakness.   Endo/Heme/Allergies: Negative for environmental allergies.   All other systems reviewed and are negative.         Objective:     /90 (BP Location: Left arm, Patient Position: Sitting, BP Cuff Size: Adult)   Pulse 75   Temp 37.2 °C (98.9 °F) (Temporal)   Resp 17   Ht 1.6 m (5' 3\")   Wt 88 kg (194 lb)   SpO2 97%   BMI 34.37 kg/m²      Physical Exam  Vitals signs reviewed.   Constitutional:       General: She is awake. She is not in acute distress.     Appearance: She is well-developed. She is not ill-appearing, toxic-appearing or diaphoretic.   HENT:      Head: Normocephalic.      Right Ear: Ear canal and external ear normal. A middle ear effusion is present.      Left Ear: Ear canal and external ear normal. A middle ear effusion is present.      Nose: Nasal tenderness, mucosal edema, congestion and rhinorrhea present.      Right Sinus: Maxillary sinus tenderness and frontal sinus tenderness present.      Left Sinus: Maxillary sinus tenderness and frontal sinus tenderness present.      Mouth/Throat:      Mouth: Mucous membranes are dry.      Pharynx: Oropharynx is clear. Uvula midline. No pharyngeal swelling, oropharyngeal exudate, posterior oropharyngeal erythema or uvula swelling.   Eyes:      Conjunctiva/sclera: Conjunctivae normal.      Pupils: Pupils are equal, round, and reactive to light.   Neck:      Musculoskeletal: Normal range of motion and neck supple.   Cardiovascular:      Rate and Rhythm: Normal rate and regular rhythm.   Pulmonary:      Effort: Pulmonary effort is normal.   Musculoskeletal: Normal range of motion.   Lymphadenopathy:    "   Cervical: No cervical adenopathy.   Skin:     General: Skin is warm and dry.   Neurological:      Mental Status: She is alert and oriented to person, place, and time.   Psychiatric:         Behavior: Behavior is cooperative.                 Assessment/Plan:       1. Acute pansinusitis, recurrence not specified    - amoxicillin-clavulanate (AUGMENTIN) 875-125 MG Tab; Take 1 Tab by mouth 2 times a day for 7 days.  Dispense: 14 Tab; Refill: 0  - fluticasone (FLONASE) 50 MCG/ACT nasal spray; Spray 2 Sprays in nose every day.  Dispense: 1 Bottle; Refill: 0  - predniSONE (DELTASONE) 10 MG Tab; Take 1 Tab by mouth every day for 5 days.  Dispense: 5 Tab; Refill: 0    Increase water intake  Get rest  May use Ibuprofen/Tylenol prn for any fever, body aches or throat pain  May take longer acting antihistamine for seasonal allergy symptoms prn  May use saline nasal spray for nasal congestion  May use Flonase or Nasocort for allergen nasal congestion  May gargle with salt water prn for throat discomfort  May drink smoothies for nutrition if too painful to swallow solid foods  Monitor for productive cough, SOB and chest pain/tightness- need re-evaluation

## 2019-11-22 NOTE — PROGRESS NOTES
New Patient Consult Note  Referred by: VAHE Dyer    Reason for consult: Diabetes Management Type 2    HPI:  Rosalba Méndez is a 54 y.o. old patient who is seeing us today for diabetes care.  This is a pleasant patient with diabetes and I appreciate the opportunity to participate in the care of this patient.  This is a new patient with me today.    Labs of 11/22/2019 HbA1c is 10.1      BG Diary:11/22/2019  In the AM: no log    Has been Diabetic since  Has a Glucagon pen at home: no    They get there labs done at:  They see an eye provider at:  Last foot exam was:  Hyperlipidemia:    1. Uncontrolled type 2 diabetes mellitus with hyperglycemia (HCC)  This is a new patient with me on 11/22/2019  They are on:  1.  Metformin  1000mg BID  2.  Glimepiride  3.  Steglatro (not on)    START:  1.  Steglatro  2.  Metformin  1000mg  3.  Trulicity 0.75 once a week and in 4 weeks increase to 1.5    Stop:  2.  Glimepiride    2. Essential hypertension  This is stable today and no new changes are needed or required in today's visit      ROS:   Constitutional: No change in weight , No fatigue, No night sweats.  HEENT: No Headache.  Eyes:  No blurred vision, No visual changes.  Cardiac: No chest pain, No palpitations.  Resp: No shortness of breath, No cough,   Gastro: No nausea or vomiting, No diarrhea.  Neuro: Denies numbness or tinging in bilateral feet or hands, and no loss of sensation.  Endo: No heat or cold intolerance.  : No polyuria, No polydipsia, No chronic UTI's.  Lower extremities: No lower leg edema bilateral.  All other systems were reviewed and were negative.    Past Medical History:  Patient Active Problem List    Diagnosis Date Noted   • Uncontrolled type 2 diabetes mellitus with hyperglycemia (HCC) 11/22/2019   • HTN (hypertension) 11/22/2019       Past Surgical History:  Past Surgical History:   Procedure Laterality Date   • OTHER ABDOMINAL SURGERY      cholecystectomy        Allergies:  Patient has no known allergies.    Social History:  Social History     Socioeconomic History   • Marital status:      Spouse name: Not on file   • Number of children: Not on file   • Years of education: Not on file   • Highest education level: Not on file   Occupational History   • Not on file   Social Needs   • Financial resource strain: Not on file   • Food insecurity:     Worry: Not on file     Inability: Not on file   • Transportation needs:     Medical: Not on file     Non-medical: Not on file   Tobacco Use   • Smoking status: Never Smoker   • Smokeless tobacco: Never Used   Substance and Sexual Activity   • Alcohol use: No   • Drug use: No   • Sexual activity: Not on file   Lifestyle   • Physical activity:     Days per week: Not on file     Minutes per session: Not on file   • Stress: Not on file   Relationships   • Social connections:     Talks on phone: Not on file     Gets together: Not on file     Attends Congregation service: Not on file     Active member of club or organization: Not on file     Attends meetings of clubs or organizations: Not on file     Relationship status: Not on file   • Intimate partner violence:     Fear of current or ex partner: Not on file     Emotionally abused: Not on file     Physically abused: Not on file     Forced sexual activity: Not on file   Other Topics Concern   • Not on file   Social History Narrative   • Not on file       Family History:  History reviewed. No pertinent family history.    Medications:    Current Outpatient Medications:   •  FLUCELVAX QUADRIVALENT 0.5 ML Suspension Prefilled Syringe injection, ADM 0.5ML IM UTD, Disp: , Rfl: 0  •  omeprazole (PRILOSEC) 40 MG delayed-release capsule, , Disp: , Rfl:   •  amoxicillin-clavulanate (AUGMENTIN) 875-125 MG Tab, Take 1 Tab by mouth 2 times a day for 7 days., Disp: 14 Tab, Rfl: 0  •  fluticasone (FLONASE) 50 MCG/ACT nasal spray, Spray 2 Sprays in nose every day., Disp: 1 Bottle, Rfl: 0  •   "predniSONE (DELTASONE) 10 MG Tab, Take 1 Tab by mouth every day for 5 days., Disp: 5 Tab, Rfl: 0  •  glimepiride (AMARYL) 4 MG Tab, Take 4 mg by mouth every day., Disp: , Rfl: 1  •  metformin (GLUCOPHAGE) 1000 MG tablet, Take 1,000 mg by mouth 2 Times a Day., Disp: , Rfl: 3  •  acetaminophen (TYLENOL) 500 MG Tab, Take 500-1,000 mg by mouth every 6 hours as needed., Disp: , Rfl:   •  ondansetron (ZOFRAN ODT) 4 MG TABLET DISPERSIBLE, Take 1 Tab by mouth every 6 hours as needed for Nausea. (Patient not taking: Reported on 11/21/2019), Disp: 20 Tab, Rfl: 0  •  ibuprofen (MOTRIN) 600 MG Tab, Take 1 Tab by mouth every 6 hours as needed for Moderate Pain. (Patient not taking: Reported on 11/21/2019), Disp: 30 Tab, Rfl: 0  •  lisinopril (PRINIVIL) 10 MG Tab, Take 10 mg by mouth every day., Disp: , Rfl:   •  ibuprofen (MOTRIN) 600 MG Tab, Take 600 mg by mouth every 8 hours as needed., Disp: , Rfl:       Physical Examination:   Vital signs: /82 (BP Location: Left arm, Patient Position: Sitting)   Pulse 66   Ht 1.6 m (5' 3\")   Wt 88 kg (194 lb)   SpO2 98%   BMI 34.37 kg/m²   General: No distress, cooperative, well dressed and well nourished.   Eyes: No scleral icterus or discharge, No hyposphagma  ENMT: Normal on external inspection of nose, lips, No nasal drainage   Neck: No abnormal masses on inspection  Resp: Normal effort, Bilateral clear to auscultation, No wheezing, No rales  CVS: Regular rate and rhythm, S1 S2 normal, No murmur. No gallop  Extremities: No edema bilateral extremities  Neuro: Alert and oriented  Skin: No rash, No Ulcers  Psych: Normal mood and affect      Assessment and Plan:    1. Uncontrolled type 2 diabetes mellitus with hyperglycemia (HCC)    START:  1.  Steglatro  2.  Metformin  1000mg  3.  Trulicity 0.75 once a week and in 4 weeks increase to 1.5    Stop:  2.  Glimepiride    2. Essential hypertension  This is stable today and no new changes are needed or required in today's " visit    Return in about 6 weeks (around 1/3/2020).      This patient during there office visit was started on new medication Trulicity and steglatro.  Side effects of new medications were discussed with the patient today in the office. The patient was supplied paperwork on this new medication.    Thank you kindly for allowing me to participate in the diabetes care plan for this patient.    Ellis Hernandez PA-C, BC-ADM  Board Certified - Advanced Diabetes Management  11/22/19    CC:   VAHE Dyer

## 2019-11-24 ASSESSMENT — ENCOUNTER SYMPTOMS
EYE DISCHARGE: 0
FEVER: 0
DIARRHEA: 0
MYALGIAS: 0
TINGLING: 0
CHILLS: 0
ABDOMINAL PAIN: 0
SPEECH CHANGE: 0
FOCAL WEAKNESS: 0
COUGH: 0
DIZZINESS: 0
VOMITING: 0
NAUSEA: 0
CONSTIPATION: 0
SINUS PAIN: 1
NECK PAIN: 0
SORE THROAT: 0
SENSORY CHANGE: 0
EYE REDNESS: 0
WEAKNESS: 0
HEADACHES: 1

## 2019-11-27 ENCOUNTER — TELEPHONE (OUTPATIENT)
Dept: ENDOCRINOLOGY | Facility: MEDICAL CENTER | Age: 54
End: 2019-11-27

## 2020-01-13 ENCOUNTER — TELEPHONE (OUTPATIENT)
Dept: ENDOCRINOLOGY | Facility: MEDICAL CENTER | Age: 55
End: 2020-01-13

## 2020-01-13 NOTE — TELEPHONE ENCOUNTER
Was the patient seen in the last year in this department? Yes    Does patient have an active prescription for medications requested? No     Received Request Via: Patient     Patient called to say she ran out of Trulicity and needs a new rx sent to pharmacy

## 2020-01-16 ENCOUNTER — OFFICE VISIT (OUTPATIENT)
Dept: ENDOCRINOLOGY | Facility: MEDICAL CENTER | Age: 55
End: 2020-01-16
Payer: COMMERCIAL

## 2020-01-16 VITALS
DIASTOLIC BLOOD PRESSURE: 70 MMHG | HEIGHT: 63 IN | HEART RATE: 76 BPM | OXYGEN SATURATION: 98 % | WEIGHT: 186 LBS | BODY MASS INDEX: 32.96 KG/M2 | SYSTOLIC BLOOD PRESSURE: 128 MMHG

## 2020-01-16 DIAGNOSIS — E11.65 UNCONTROLLED TYPE 2 DIABETES MELLITUS WITH HYPERGLYCEMIA (HCC): ICD-10-CM

## 2020-01-16 DIAGNOSIS — I10 ESSENTIAL HYPERTENSION: ICD-10-CM

## 2020-01-16 PROCEDURE — 99214 OFFICE O/P EST MOD 30 MIN: CPT | Performed by: PHYSICIAN ASSISTANT

## 2020-01-16 RX ORDER — PIOGLITAZONEHYDROCHLORIDE 30 MG/1
30 TABLET ORAL DAILY
Qty: 30 TAB | Refills: 11 | Status: SHIPPED | OUTPATIENT
Start: 2020-01-16 | End: 2020-07-10 | Stop reason: SDUPTHER

## 2020-01-16 NOTE — PATIENT INSTRUCTIONS
START:  1.  Steglatro 15mg one a day  2.  Metformin  1000mg twice a day  3.  Trulicity 1.5 once a week  4.  Actos 30mg one a day

## 2020-01-16 NOTE — PROGRESS NOTES
Return to office Patient Consult Note  Referred by: VAHE Dyer    Reason for consult: Diabetes Management Type 2    HPI:  Rosalba Méndez is a 55 y.o. old patient who is seeing us today for diabetes care.  This is a pleasant patient with diabetes and I appreciate the opportunity to participate in the care of this patient.    Labs of 11/22/2019 HbA1c is 10.1    BG Diary:1/16/2020  In the AM:   no log    1. Uncontrolled type 2 diabetes mellitus with hyperglycemia (HCC)  This is a new patient with me on 11/22/2019  They are on:  1.  Metformin  1000mg BID  2.  Glimepiride  3.  Steglatro (not on)     START:  1.  Steglatro  2.  Metformin  1000mg  3.  Trulicity 1.5 once a week      Stop:  2.  Glimepiride     2. Essential hypertension  This is stable today and no new changes are needed or required in today's visit      ROS:   Constitutional: No night sweats.  Eyes:  No visual changes.  Cardiac: No chest pain, No palpitations or racing heart rate.  Resp: No shortness of breath, No cough,   Gi: No Diarrhea    All other systems were reviewed and were/are negative.      Past Medical History:  Patient Active Problem List    Diagnosis Date Noted   • Uncontrolled type 2 diabetes mellitus with hyperglycemia (HCC) 11/22/2019   • HTN (hypertension) 11/22/2019       Past Surgical History:  Past Surgical History:   Procedure Laterality Date   • OTHER ABDOMINAL SURGERY      cholecystectomy       Allergies:  Patient has no known allergies.    Social History:  Social History     Socioeconomic History   • Marital status:      Spouse name: Not on file   • Number of children: Not on file   • Years of education: Not on file   • Highest education level: Not on file   Occupational History   • Not on file   Social Needs   • Financial resource strain: Not on file   • Food insecurity:     Worry: Not on file     Inability: Not on file   • Transportation needs:     Medical: Not on file     Non-medical: Not on file    Tobacco Use   • Smoking status: Never Smoker   • Smokeless tobacco: Never Used   Substance and Sexual Activity   • Alcohol use: No   • Drug use: No   • Sexual activity: Not on file   Lifestyle   • Physical activity:     Days per week: Not on file     Minutes per session: Not on file   • Stress: Not on file   Relationships   • Social connections:     Talks on phone: Not on file     Gets together: Not on file     Attends [employer] service: Not on file     Active member of club or organization: Not on file     Attends meetings of clubs or organizations: Not on file     Relationship status: Not on file   • Intimate partner violence:     Fear of current or ex partner: Not on file     Emotionally abused: Not on file     Physically abused: Not on file     Forced sexual activity: Not on file   Other Topics Concern   • Not on file   Social History Narrative   • Not on file       Family History:  History reviewed. No pertinent family history.    Medications:    Current Outpatient Medications:   •  metformin (GLUCOPHAGE) 1000 MG tablet, Take 1 Tab by mouth 2 Times a Day., Disp: 60 Tab, Rfl: 3  •  Dulaglutide (TRULICITY) 1.5 MG/0.5ML Solution Pen-injector, Inject 0.5 mL as instructed every 7 days., Disp: 4 PEN, Rfl: 6  •  Ertugliflozin L-PyroglutamicAc (STEGLATRO) 15 MG Tab, Take 1 tablet by mouth every day., Disp: 30 Tab, Rfl: 11  •  pioglitazone (ACTOS) 30 MG Tab, Take 1 Tab by mouth every day., Disp: 30 Tab, Rfl: 11  •  glucose blood (ONE TOUCH ULTRA TEST) strip, 1 Strip by Other route 2 times a day, with meals., Disp: 50 Strip, Rfl: 11  •  FLUCELVAX QUADRIVALENT 0.5 ML Suspension Prefilled Syringe injection, ADM 0.5ML IM UTD, Disp: , Rfl: 0  •  omeprazole (PRILOSEC) 40 MG delayed-release capsule, , Disp: , Rfl:   •  fluticasone (FLONASE) 50 MCG/ACT nasal spray, Spray 2 Sprays in nose every day., Disp: 1 Bottle, Rfl: 0  •  acetaminophen (TYLENOL) 500 MG Tab, Take 500-1,000 mg by mouth every 6 hours as needed., Disp: , Rfl:  "  •  ondansetron (ZOFRAN ODT) 4 MG TABLET DISPERSIBLE, Take 1 Tab by mouth every 6 hours as needed for Nausea. (Patient not taking: Reported on 11/21/2019), Disp: 20 Tab, Rfl: 0  •  ibuprofen (MOTRIN) 600 MG Tab, Take 1 Tab by mouth every 6 hours as needed for Moderate Pain. (Patient not taking: Reported on 11/21/2019), Disp: 30 Tab, Rfl: 0  •  lisinopril (PRINIVIL) 10 MG Tab, Take 10 mg by mouth every day., Disp: , Rfl:   •  ibuprofen (MOTRIN) 600 MG Tab, Take 600 mg by mouth every 8 hours as needed., Disp: , Rfl:         Physical Examination:   Vital signs: /70 (BP Location: Left arm, Patient Position: Sitting)   Pulse 76   Ht 1.6 m (5' 3\")   Wt 84.4 kg (186 lb)   SpO2 98%   BMI 32.95 kg/m²   General: No distress, cooperative, well dressed and well nourished.   Eyes: No scleral icterus or discharge, No hyposphagma  ENMT: Normal on external inspection of nose, lips, No nasal drainage   Neck: No abnormal masses on inspection  Resp: Normal effort, Bilateral clear to auscultation, No wheezing, No rales  CVS: Regular rate and rhythm, S1 S2 normal, No murmur. No gallop  Extremities: No edema bilateral extremities  Neuro: Alert and oriented  Skin: No rash, No Ulcers  Psych: Normal mood and affect      Assessment and Plan:    1. Uncontrolled type 2 diabetes mellitus with hyperglycemia (HCC)    START:  1.  Steglatro 15mg one a day  2.  Metformin  1000mg twice a day  3.  Trulicity 1.5 once a week   4.  Actos 30mg one a day    2. Essential hypertension  This is stable today and no new changes are needed or required in today's visit    ADDENDUM: 2/7/2020  Insurance refusing to pay for GLP-1.  Will now give Novolin N 15 units before breakfast and before dinner.  This is 30 year old medicine however it is what the insurance is willing to pay for.  Hopefully if the patient ends up with cancer the insurance will pay for current treatment and not 30 year old treatment as they do for diabetes      Return in about 6 weeks " (around 2/27/2020).      Thank you kindly for allowing me to participate in the diabetes care plan for this patient.    Ellis Hernandez PA-C, BC-[facility]  Board Certified - Advanced Diabetes Management  01/16/20    CC:   VAHE Dyer

## 2020-01-31 ENCOUNTER — TELEPHONE (OUTPATIENT)
Dept: ENDOCRINOLOGY | Facility: MEDICAL CENTER | Age: 55
End: 2020-01-31

## 2020-01-31 NOTE — TELEPHONE ENCOUNTER
DOCUMENTATION OF PAR STATUS:    1. Name of Medication & Dose: Trulicity 1.5mg/0.5ml     2. Name of Prescription Coverage Company & phone #: [email or web address]     3. Date Prior Auth Submitted: 01/31/2020    4. What information was given to obtain insurance decision? Clinical notes/labs    5. Prior Auth Status?  Pending    6. Patient Notified: yes

## 2020-01-31 NOTE — TELEPHONE ENCOUNTER
1. Caller Name:  Rosalba Méndez                    Call Back Number: [phone]      How would the patient prefer to be contacted with a response: Okay to leave detailed messaged   Please call patient regarding her Trulicity prescription there is a problem with her insurance  authorization approval.    Thank

## 2020-02-05 NOTE — TELEPHONE ENCOUNTER
FINAL PRIOR AUTHORIZATION STATUS:    1.  Name of Medication & Dose: Trulicity 1.5/0.5     2. Prior Auth Status: Denied.  Reason: Unable to approve TRULICITY (Dulaglutide Solution Pen-injector 1.5 MG/0.5ML) due to unmet criteria (3) as outlined in the plan guideline below. Please use the following preferred formulary medications, unless contraindicated: metformin for at least 3 consecutive months as evidenced by an HbA1c of 7% or higher or currently using metformin and an HbA1c (dated within the last 3 months) of 8.5% or higher. Plan guideline HIM.PA.53 (glucagon-like peptide-1 agonists (GLP-1 agonists)) requires all of the following are met prior to consideration of approval: 1. Diagnosis of type 2 diabetes mellitus; 2. Age is one of the following (a or b): a. Victoza: at least 10 years; b. All other GLP-1 receptor agonists: at least 18 years; 3. Member meets one of the following (a or b): a. Failure of at least 3 consecutive months of metformin as evidenced by HbA1c 7% or higher, unless contraindicated or clinically significant adverse effects are experienced; b. HbA1c drawn within the past 3 months is 8.5% or higher, and concurrent use of metformin unless contraindicated or clinically significant adverse effects are experienced; 4. Dose does not exceed the FDA approved maximum recommended dose. Note: If you believe the member has met criteria (3) as described above, please resubmit your request with additional clinically supportive documentation for consideration.    3. Action Taken: Pharmacy Notified: yes Patient Notified: yes    Please advise

## 2020-02-05 NOTE — TELEPHONE ENCOUNTER
Pt called again stating she is running out of Trulicity 1.5mg while waiting for prior auth to go through.  She is asking for additional samples she will run out on Friday    Thank you,

## 2020-02-06 NOTE — TELEPHONE ENCOUNTER
VOICEMAIL  1. Caller Name: Rosalba Ménedz                      Call Back Number: [phone]    2. Message: Patient called requesting samples for trulicity while she waits on Ellis with the decision he would like to make on her denial.     I contacted patient to let her know I set aside some samples for her to help her while Ellis figures out what he would like to do.    3. Patient approves office to leave a detailed voicemail/MyChart message: yes

## 2020-02-07 NOTE — TELEPHONE ENCOUNTER
Phone Number Called: [phone] (home)     Call outcome: Spoke to patient regarding message below.    Message: Informed her of the change in medication due to the denial from her insurance. She understood and will wait to hear from the pharmacy for the status of the new med refill.

## 2020-02-10 ENCOUNTER — TELEPHONE (OUTPATIENT)
Dept: ENDOCRINOLOGY | Facility: MEDICAL CENTER | Age: 55
End: 2020-02-10

## 2020-02-11 NOTE — TELEPHONE ENCOUNTER
Patient was previously on Trulicity and was recently changed to Novolog, but pt states medication is not working for her and is asking to go back on Trulicity.     [phone]

## 2020-02-12 NOTE — TELEPHONE ENCOUNTER
Phone Number Called: [phone]    Call outcome: Left detailed message for patient. Informed to call back with any additional questions.    Message: I left a message to patient so she could contact insurance to see what insurance would cover. We have denial letter for trulicity scanned in her file and per Ellis he would prefer for her to find out what her insurance covers that way he can prescript accordingly.

## 2020-02-28 ENCOUNTER — APPOINTMENT (OUTPATIENT)
Dept: ENDOCRINOLOGY | Facility: MEDICAL CENTER | Age: 55
End: 2020-02-28
Payer: COMMERCIAL

## 2020-02-28 ENCOUNTER — OFFICE VISIT (OUTPATIENT)
Dept: ENDOCRINOLOGY | Facility: MEDICAL CENTER | Age: 55
End: 2020-02-28

## 2020-02-28 VITALS
DIASTOLIC BLOOD PRESSURE: 78 MMHG | HEART RATE: 91 BPM | OXYGEN SATURATION: 100 % | WEIGHT: 182.1 LBS | SYSTOLIC BLOOD PRESSURE: 124 MMHG | HEIGHT: 63 IN | BODY MASS INDEX: 32.27 KG/M2

## 2020-02-28 DIAGNOSIS — E11.65 UNCONTROLLED TYPE 2 DIABETES MELLITUS WITH HYPERGLYCEMIA (HCC): Primary | ICD-10-CM

## 2020-02-28 LAB
HBA1C MFR BLD: 8.4 % (ref 0–5.6)
INT CON NEG: NEGATIVE
INT CON POS: POSITIVE

## 2020-02-28 PROCEDURE — 83036 HEMOGLOBIN GLYCOSYLATED A1C: CPT | Performed by: PHYSICIAN ASSISTANT

## 2020-02-28 PROCEDURE — 99213 OFFICE O/P EST LOW 20 MIN: CPT | Performed by: PHYSICIAN ASSISTANT

## 2020-02-28 ASSESSMENT — FIBROSIS 4 INDEX: FIB4 SCORE: 0.9

## 2020-02-28 NOTE — PROGRESS NOTES
Return to office Patient Consult Note  Referred by: VAHE Dyer (Inactive)    Reason for consult: Diabetes Management Type 2    HPI:  Rosalba Méndez is a 55 y.o. old patient who is seeing us today for diabetes care.  This is a pleasant patient with diabetes and I appreciate the opportunity to participate in the care of this patient.    Labs of 2/28/2020 HbA1c is 8.4  Labs of 11/22/2019 HbA1c is 10.1      1. Uncontrolled type 2 diabetes mellitus with hyperglycemia (HCC)    This is a new patient with me on 11/22/2019  They are on:  1.  Metformin  1000mg BID  2.  Glimepiride  3.  Steglatro (not on)     Now on:  1.  Steglatro 15mg one a day  2.  Metformin  1000mg twice a day  3.  Trulicity 1.5 once a week  (not on)  4.  Actos 30mg one a day    ADDENDUM: 2/7/2020 (she did not pick this up)  Insurance refusing to pay for GLP-1.  Will now give Novolin N 15 units before breakfast and before dinner.  This is 30 year old medicine however it is what the insurance is willing to pay for.  Hopefully if the patient ends up with cancer the insurance will pay for current treatment and not 30 year old treatment as they do for diabetes       ROS:   Cardiac: No chest pain,   Resp: No shortness of breath,   Gi: No Diarrhea        Past Medical History:  Patient Active Problem List    Diagnosis Date Noted   • Uncontrolled type 2 diabetes mellitus with hyperglycemia (HCC) 11/22/2019   • HTN (hypertension) 11/22/2019       Past Surgical History:  Past Surgical History:   Procedure Laterality Date   • OTHER ABDOMINAL SURGERY      cholecystectomy       Allergies:  Patient has no known allergies.    Social History:  Social History     Socioeconomic History   • Marital status:      Spouse name: Not on file   • Number of children: Not on file   • Years of education: Not on file   • Highest education level: Not on file   Occupational History   • Not on file   Social Needs   • Financial resource strain: Not on file  "  • Food insecurity     Worry: Not on file     Inability: Not on file   • Transportation needs     Medical: Not on file     Non-medical: Not on file   Tobacco Use   • Smoking status: Never Smoker   • Smokeless tobacco: Never Used   Substance and Sexual Activity   • Alcohol use: No   • Drug use: No   • Sexual activity: Not on file   Lifestyle   • Physical activity     Days per week: Not on file     Minutes per session: Not on file   • Stress: Not on file   Relationships   • Social connections     Talks on phone: Not on file     Gets together: Not on file     Attends [employer] service: Not on file     Active member of club or organization: Not on file     Attends meetings of clubs or organizations: Not on file     Relationship status: Not on file   • Intimate partner violence     Fear of current or ex partner: Not on file     Emotionally abused: Not on file     Physically abused: Not on file     Forced sexual activity: Not on file   Other Topics Concern   • Not on file   Social History Narrative   • Not on file       Family History:  History reviewed. No pertinent family history.    Medications:    Current Outpatient Medications:   •  Insulin Syringe-Needle U-100 (EASY COMFORT INSULIN SYRINGE) 32G X 5/16\" 0.5 ML Misc, 1 Syringe by Does not apply route 2 Times a Day., Disp: 100 Each, Rfl: 11  •  insulin NPH (HUMULIN/NOVOLIN) 100 UNIT/ML Suspension, Inject 15 Units as instructed 2 Times a Day. Take before breakfast and before dinner, Disp: 30 mL, Rfl: 10  •  metformin (GLUCOPHAGE) 1000 MG tablet, Take 1 Tab by mouth 2 Times a Day., Disp: 60 Tab, Rfl: 3  •  Ertugliflozin L-PyroglutamicAc (STEGLATRO) 15 MG Tab, Take 1 tablet by mouth every day., Disp: 30 Tab, Rfl: 11  •  pioglitazone (ACTOS) 30 MG Tab, Take 1 Tab by mouth every day., Disp: 30 Tab, Rfl: 11  •  glucose blood (ONE TOUCH ULTRA TEST) strip, 1 Strip by Other route 2 times a day, with meals., Disp: 50 Strip, Rfl: 11  •  FLUCELVAX QUADRIVALENT 0.5 ML Suspension " "Prefilled Syringe injection, ADM 0.5ML IM UTD, Disp: , Rfl: 0  •  omeprazole (PRILOSEC) 40 MG delayed-release capsule, , Disp: , Rfl:   •  fluticasone (FLONASE) 50 MCG/ACT nasal spray, Spray 2 Sprays in nose every day., Disp: 1 Bottle, Rfl: 0  •  acetaminophen (TYLENOL) 500 MG Tab, Take 500-1,000 mg by mouth every 6 hours as needed., Disp: , Rfl:   •  lisinopril (PRINIVIL) 10 MG Tab, Take 10 mg by mouth every day., Disp: , Rfl:   •  ibuprofen (MOTRIN) 600 MG Tab, Take 600 mg by mouth every 8 hours as needed., Disp: , Rfl:       Physical Examination:   Vital signs: /78 (BP Location: Left arm, Patient Position: Sitting, BP Cuff Size: Adult)   Pulse 91   Ht 1.6 m (5' 3\")   Wt 82.6 kg (182 lb 1.6 oz)   SpO2 100%   BMI 32.26 kg/m²   General: No distress, cooperative, well dressed and well nourished.   Resp: Normal effort, Bilateral clear to auscultation, No wheezing, No rales  CVS: Regular rate and rhythm, S1 S2 normal, No murmur.       Assessment and Plan:    1. Uncontrolled type 2 diabetes mellitus with hyperglycemia (HCC)    Now on:  1.  Steglatro 15mg one a day  2.  Metformin  1000mg twice a day  3.  Trulicity 1.5 once a week  (not on)  4.  Actos 30mg one a day    ADDENDUM: 2/7/2020 (she did not pick this up)  Insurance refusing to pay for GLP-1.  Will now give Novolin N 15 units before breakfast and before dinner.  This is 30 year old medicine however it is what the insurance is willing to pay for.  Hopefully if the patient ends up with cancer the insurance will pay for current treatment and not 30 year old treatment as they do for diabetes    Return in about 3 months (around 5/28/2020).    Thank you kindly for allowing me to participate in the diabetes care plan for this patient.    Ellis Hernandez PA-C, BC-ADM  Board Certified - Advanced Diabetes [facility]  02/28/20    CC:   VAHE Dyer (Inactive)    "

## 2020-07-10 ENCOUNTER — OFFICE VISIT (OUTPATIENT)
Dept: ENDOCRINOLOGY | Facility: MEDICAL CENTER | Age: 55
End: 2020-07-10
Attending: INTERNAL MEDICINE
Payer: COMMERCIAL

## 2020-07-10 VITALS
HEIGHT: 63 IN | BODY MASS INDEX: 32.96 KG/M2 | WEIGHT: 186 LBS | SYSTOLIC BLOOD PRESSURE: 112 MMHG | DIASTOLIC BLOOD PRESSURE: 70 MMHG | HEART RATE: 67 BPM | OXYGEN SATURATION: 99 %

## 2020-07-10 DIAGNOSIS — E11.65 UNCONTROLLED TYPE 2 DIABETES MELLITUS WITH HYPERGLYCEMIA (HCC): ICD-10-CM

## 2020-07-10 DIAGNOSIS — Z79.84 LONG TERM (CURRENT) USE OF ORAL HYPOGLYCEMIC DRUGS: ICD-10-CM

## 2020-07-10 DIAGNOSIS — I10 ESSENTIAL HYPERTENSION: ICD-10-CM

## 2020-07-10 DIAGNOSIS — E78.5 DYSLIPIDEMIA: ICD-10-CM

## 2020-07-10 PROCEDURE — 99214 OFFICE O/P EST MOD 30 MIN: CPT | Performed by: INTERNAL MEDICINE

## 2020-07-10 PROCEDURE — 99212 OFFICE O/P EST SF 10 MIN: CPT | Performed by: INTERNAL MEDICINE

## 2020-07-10 RX ORDER — ATORVASTATIN CALCIUM 20 MG/1
20 TABLET, FILM COATED ORAL DAILY
Qty: 30 TAB | Refills: 11 | Status: SHIPPED | OUTPATIENT
Start: 2020-07-10 | End: 2020-10-16 | Stop reason: SDUPTHER

## 2020-07-10 RX ORDER — ERTUGLIFLOZIN 15 MG/1
1 TABLET, FILM COATED ORAL DAILY
Qty: 30 TAB | Refills: 11 | Status: SHIPPED | OUTPATIENT
Start: 2020-07-10 | End: 2020-10-16 | Stop reason: SDUPTHER

## 2020-07-10 RX ORDER — PIOGLITAZONEHYDROCHLORIDE 30 MG/1
30 TABLET ORAL DAILY
Qty: 90 TAB | Refills: 1 | Status: SHIPPED | OUTPATIENT
Start: 2020-07-10 | End: 2020-10-08

## 2020-07-10 ASSESSMENT — FIBROSIS 4 INDEX: FIB4 SCORE: 0.9

## 2020-07-11 NOTE — TELEPHONE ENCOUNTER
Received request via: Pharmacy    Was the patient seen in the last year in this department? Yes    Does the patient have an active prescription (recently filled or refills available) for medication(s) requested? No       metformin (GLUCOPHAGE) 1000 MG tablet         Sig: Take 1 tablet by mouth twice daily

## 2020-07-11 NOTE — PROGRESS NOTES
CHIEF COMPLAINT: Patient is here for follow up of Type 2 Diabetes Mellitus    HPI:     Rosalba Méndez is a 55 y.o. female with Type 2 Diabetes Mellitus here for follow up.    Labs from 6/29/2020 HbA1c is better at 7.2% from outside labs from [facility] we do not have the records on hand but the patient showed us her labs from her patient portal on her phone    Previously her A1c was 8.4% back on February 2020    She was previously seen by the PA.    She is currently on Steglatro 15 mg daily, Metformin 1000 mg twice a day plus Actos 30 mg daily.  She was on NPH but she stopped taking it.      She reports feeling better and her sugars are fairly controlled but she did not bring her meter    She has essential hypertension is taking lisinopril 10 mg daily  We do not have a copy of her recent GFR and urine microalbumin    She has hyperlipidemia at baseline with a baseline LDL cholesterol 126 from 3 months ago  She is currently not on a statin for primary prevention      BG Diary:07/10/20  Patient did not bring glucose meter despite repeated request to do so    Weight has been stable    Diabetes Complications   Retinopathy: No known retinopathy.  Last eye exam: She is overdue for an eye exam  Neuropathy: Denies paresthesias or numbness in hands or feet. Denies any foot wounds.  Exercise: Minimal.  Diet: Fair.  Patient's medications, allergies, and social histories were reviewed and updated as appropriate.    ROS:     CONS:     No fever, no chills   EYES:     No diplopia, no blurry vision   CV:           No chest pain, no palpitations   PULM:     No SOB, no cough, no hemoptysis.   GI:            No nausea, no vomiting, no diarrhea, no constipation   ENDO:     No polyuria, no polydipsia, no heat intolerance, no cold intolerance       Past Medical History:  Problem List:  2020-07: Dyslipidemia  2020-07: Long term (current) use of oral hypoglycemic drugs  2019-11: Uncontrolled type 2 diabetes mellitus with hyperglycemia  "  ([facility])  2019-11: Essential hypertension      Past Surgical History:  Past Surgical History:   Procedure Laterality Date   • OTHER ABDOMINAL SURGERY      cholecystectomy        Allergies:  Patient has no known allergies.     Social History:  Social History     Tobacco Use   • Smoking status: Never Smoker   • Smokeless tobacco: Never Used   Substance Use Topics   • Alcohol use: No   • Drug use: No        Family History:   family history is not on file.      PHYSICAL EXAM:   OBJECTIVE:  Vital signs: /70   Pulse 67   Ht 1.6 m (5' 3\")   Wt 84.4 kg (186 lb)   SpO2 99%   BMI 32.95 kg/m²   GENERAL: Well-developed, well-nourished in no apparent distress.   EYE:  No ocular asymmetry, PERRLA  HENT: Pink, moist mucous membranes.    NECK: No thyromegaly.   CARDIOVASCULAR:  No murmurs  LUNGS: Clear breath sounds  ABDOMEN: Soft, nontender   EXTREMITIES: No clubbing, cyanosis, or edema.   NEUROLOGICAL: No gross focal motor abnormalities   LYMPH: No cervical adenopathy palpated.   SKIN: No rashes, lesions.     Labs:  Lab Results   Component Value Date/Time    HBA1C 8.4 (A) 02/28/2020 12:41 PM        Lab Results   Component Value Date/Time    WBC 8.4 10/05/2019 10:52 AM    RBC 5.34 10/05/2019 10:52 AM    HEMOGLOBIN 14.2 10/05/2019 10:52 AM    MCV 82.4 10/05/2019 10:52 AM    MCH 26.6 (L) 10/05/2019 10:52 AM    MCHC 32.3 (L) 10/05/2019 10:52 AM    RDW 37.2 10/05/2019 10:52 AM    MPV 10.6 10/05/2019 10:52 AM       Lab Results   Component Value Date/Time    SODIUM 138 10/05/2019 10:52 AM    POTASSIUM 4.4 10/05/2019 10:52 AM    CHLORIDE 101 10/05/2019 10:52 AM    CO2 21 10/05/2019 10:52 AM    ANION 16.0 (H) 10/05/2019 10:52 AM    GLUCOSE 419 (H) 10/05/2019 10:52 AM    BUN 14 10/05/2019 10:52 AM    CREATININE 0.75 10/05/2019 10:52 AM    CREATININE 0.7 04/03/2006 09:50 AM    CALCIUM 9.3 10/05/2019 10:52 AM    ASTSGOT 15 10/05/2019 10:52 AM    ALTSGPT 20 10/05/2019 10:52 AM    TBILIRUBIN 0.2 10/05/2019 10:52 AM    ALBUMIN 4.2 " 10/05/2019 10:52 AM    TOTPROTEIN 7.5 10/05/2019 10:52 AM    GLOBULIN 3.3 10/05/2019 10:52 AM    AGRATIO 1.3 10/05/2019 10:52 AM       No results found for: CHOLSTRLTOT, TRIGLYCERIDE, HDL, LDL, CHOLHDLRAT, NONHDL    No results found for: MICROALBCALC, MALBCRT, MALBEXCR, [id number], MICROALBUR, MICRALB, UMICROALBUM, MICROALBTIM     No results found for: TSHULTRASEN  No results found for: FREEDIR  No results found for: FREET3  No results found for: THYSTIMIG        ASSESSMENT/PLAN:     1. Uncontrolled type 2 diabetes mellitus with hyperglycemia (HCC)  Uncontrolled secondary to hyperglycemia with improvement  A1c likely better at 7.2%  We discussed the possibility of adding a GLP-1 analog but patient wants to wait   Continue Steglatro, Metformin and Actos at max doses  Recommend that she watch her carb intake and exercise regularly  Recommend that she get an updated eye exam  We will plan for follow-up in 3 months with labs  I am requesting copies of her labs from [facility]    2. Essential hypertension  Well-controlled continue lisinopril    3. Dyslipidemia  Uncontrolled  I recommend that she start atorvastatin for primary prevention  We will repeat her fasting lipids in 3 months    4. Long term (current) use of oral hypoglycemic drugs  Patient is on multiple oral agents for type 2 diabetes management      Return in about 3 months (around 10/10/2020).      This patient during there office visit today was started on a new medication.  Side effects of the new medication were discussed with the patient today in the office.     Thank you kindly for allowing me to participate in the diabetes care plan for this patient.    Jean Arredondo MD, FACE, [facility]  07/10/20    CC:   VAHE Dyer (Inactive)

## 2020-10-09 ENCOUNTER — HOSPITAL ENCOUNTER (OUTPATIENT)
Dept: LAB | Facility: MEDICAL CENTER | Age: 55
End: 2020-10-09
Attending: INTERNAL MEDICINE
Payer: COMMERCIAL

## 2020-10-09 DIAGNOSIS — Z79.84 LONG TERM (CURRENT) USE OF ORAL HYPOGLYCEMIC DRUGS: ICD-10-CM

## 2020-10-09 DIAGNOSIS — I10 ESSENTIAL HYPERTENSION: ICD-10-CM

## 2020-10-09 DIAGNOSIS — E78.5 DYSLIPIDEMIA: ICD-10-CM

## 2020-10-09 DIAGNOSIS — E11.65 UNCONTROLLED TYPE 2 DIABETES MELLITUS WITH HYPERGLYCEMIA (HCC): ICD-10-CM

## 2020-10-09 LAB
ALBUMIN SERPL BCP-MCNC: 4.3 G/DL (ref 3.2–4.9)
ALBUMIN/GLOB SERPL: 1.6 G/DL
ALP SERPL-CCNC: 71 U/L (ref 30–99)
ALT SERPL-CCNC: 16 U/L (ref 2–50)
ANION GAP SERPL CALC-SCNC: 9 MMOL/L (ref 7–16)
AST SERPL-CCNC: 17 U/L (ref 12–45)
BILIRUB SERPL-MCNC: 0.2 MG/DL (ref 0.1–1.5)
BUN SERPL-MCNC: 20 MG/DL (ref 8–22)
CALCIUM SERPL-MCNC: 9.7 MG/DL (ref 8.5–10.5)
CHLORIDE SERPL-SCNC: 105 MMOL/L (ref 96–112)
CHOLEST SERPL-MCNC: 184 MG/DL (ref 100–199)
CO2 SERPL-SCNC: 26 MMOL/L (ref 20–33)
CREAT SERPL-MCNC: 0.67 MG/DL (ref 0.5–1.4)
EST. AVERAGE GLUCOSE BLD GHB EST-MCNC: 183 MG/DL
FASTING STATUS PATIENT QL REPORTED: NORMAL
GLOBULIN SER CALC-MCNC: 2.7 G/DL (ref 1.9–3.5)
GLUCOSE SERPL-MCNC: 146 MG/DL (ref 65–99)
HBA1C MFR BLD: 8 % (ref 0–5.6)
HDLC SERPL-MCNC: 39 MG/DL
LDLC SERPL CALC-MCNC: 118 MG/DL
POTASSIUM SERPL-SCNC: 4.6 MMOL/L (ref 3.6–5.5)
PROT SERPL-MCNC: 7 G/DL (ref 6–8.2)
SODIUM SERPL-SCNC: 140 MMOL/L (ref 135–145)
T4 FREE SERPL-MCNC: 1 NG/DL (ref 0.93–1.7)
TRIGL SERPL-MCNC: 136 MG/DL (ref 0–149)
TSH SERPL DL<=0.005 MIU/L-ACNC: 3.1 UIU/ML (ref 0.38–5.33)

## 2020-10-09 PROCEDURE — 80053 COMPREHEN METABOLIC PANEL: CPT

## 2020-10-09 PROCEDURE — 82043 UR ALBUMIN QUANTITATIVE: CPT

## 2020-10-09 PROCEDURE — 80061 LIPID PANEL: CPT

## 2020-10-09 PROCEDURE — 83036 HEMOGLOBIN GLYCOSYLATED A1C: CPT

## 2020-10-09 PROCEDURE — 36415 COLL VENOUS BLD VENIPUNCTURE: CPT

## 2020-10-09 PROCEDURE — 82570 ASSAY OF URINE CREATININE: CPT

## 2020-10-09 PROCEDURE — 84439 ASSAY OF FREE THYROXINE: CPT

## 2020-10-09 PROCEDURE — 84443 ASSAY THYROID STIM HORMONE: CPT

## 2020-10-10 LAB
CREAT UR-MCNC: 52.77 MG/DL
MICROALBUMIN UR-MCNC: <1.2 MG/DL
MICROALBUMIN/CREAT UR: NORMAL MG/G (ref 0–30)

## 2020-10-16 ENCOUNTER — OFFICE VISIT (OUTPATIENT)
Dept: ENDOCRINOLOGY | Facility: MEDICAL CENTER | Age: 55
End: 2020-10-16
Attending: INTERNAL MEDICINE
Payer: COMMERCIAL

## 2020-10-16 VITALS
HEIGHT: 63 IN | OXYGEN SATURATION: 99 % | BODY MASS INDEX: 33.49 KG/M2 | WEIGHT: 189 LBS | HEART RATE: 72 BPM | DIASTOLIC BLOOD PRESSURE: 68 MMHG | SYSTOLIC BLOOD PRESSURE: 108 MMHG

## 2020-10-16 DIAGNOSIS — E11.65 UNCONTROLLED TYPE 2 DIABETES MELLITUS WITH HYPERGLYCEMIA (HCC): ICD-10-CM

## 2020-10-16 DIAGNOSIS — I10 ESSENTIAL HYPERTENSION: ICD-10-CM

## 2020-10-16 DIAGNOSIS — E78.5 DYSLIPIDEMIA: ICD-10-CM

## 2020-10-16 DIAGNOSIS — Z79.84 LONG TERM (CURRENT) USE OF ORAL HYPOGLYCEMIC DRUGS: ICD-10-CM

## 2020-10-16 PROCEDURE — 99212 OFFICE O/P EST SF 10 MIN: CPT | Performed by: INTERNAL MEDICINE

## 2020-10-16 PROCEDURE — 99214 OFFICE O/P EST MOD 30 MIN: CPT | Performed by: INTERNAL MEDICINE

## 2020-10-16 RX ORDER — GLIMEPIRIDE 2 MG/1
2 TABLET ORAL DAILY
Qty: 90 TAB | Refills: 3 | Status: SHIPPED | OUTPATIENT
Start: 2020-10-16 | End: 2021-01-14

## 2020-10-16 RX ORDER — ERTUGLIFLOZIN 15 MG/1
1 TABLET, FILM COATED ORAL DAILY
Qty: 30 TAB | Refills: 11 | Status: SHIPPED | OUTPATIENT
Start: 2020-10-16 | End: 2021-03-05

## 2020-10-16 RX ORDER — ATORVASTATIN CALCIUM 20 MG/1
20 TABLET, FILM COATED ORAL DAILY
Qty: 30 TAB | Refills: 11 | Status: SHIPPED | OUTPATIENT
Start: 2020-10-16 | End: 2021-03-05 | Stop reason: SDUPTHER

## 2020-10-16 RX ORDER — LISINOPRIL 10 MG/1
10 TABLET ORAL DAILY
Qty: 90 TAB | Refills: 3 | Status: SHIPPED | OUTPATIENT
Start: 2020-10-16 | End: 2021-01-14

## 2020-10-16 RX ORDER — PIOGLITAZONEHYDROCHLORIDE 30 MG/1
30 TABLET ORAL DAILY
Qty: 90 TAB | Refills: 3 | Status: SHIPPED | OUTPATIENT
Start: 2020-10-16 | End: 2021-01-14

## 2020-10-16 ASSESSMENT — FIBROSIS 4 INDEX: FIB4 SCORE: 1.14

## 2020-10-16 NOTE — PROGRESS NOTES
CHIEF COMPLAINT: Patient is here for follow up of Type 2 Diabetes Mellitus    HPI:     Rosalba Méndez is a 55 y.o. female with Type 2 Diabetes Mellitus here for follow up.      Labs from October 9, 2020 show A1c was 8.0%  Previously her A1c was 8.4% back on February 2020    She was previously seen by the PA.  Her insurance would not cover a GLP-1 analog unfortunately      She is currently on Steglatro 15 mg daily, Metformin 1000 mg twice a day plus Actos 30 mg daily.      She reports feeling better and her sugars are fairly controlled   but she did not bring her meter    She has essential hypertension is taking lisinopril 10 mg daily  She does not have diabetic kidney disease based on her most recent urine microalbumin from October 9, 2020      She has hyperlipidemia at baseline I started her on atorvastatin just recently  She admits to noncompliance however  Her LDL cholesterol is still elevated 118 on October 9, 2020        BG Diary:  Patient did not bring glucose meter despite repeated request to do so    Weight has been stable    Diabetes Complications   Retinopathy: No known retinopathy.  Last eye exam: I am referring her to ophthalmology  Neuropathy: Denies paresthesias or numbness in hands or feet. Denies any foot wounds.  Exercise: Minimal.  Diet: Fair.  Patient's medications, allergies, and social histories were reviewed and updated as appropriate.    ROS:     CONS:     No fever, no chills   EYES:     No diplopia, no blurry vision   CV:           No chest pain, no palpitations   PULM:     No SOB, no cough, no hemoptysis.   GI:            No nausea, no vomiting, no diarrhea, no constipation   ENDO:     No polyuria, no polydipsia, no heat intolerance, no cold intolerance       Past Medical History:  Problem List:  2020-07: Dyslipidemia  2020-07: Long term (current) use of oral hypoglycemic drugs  2019-11: Uncontrolled type 2 diabetes mellitus with hyperglycemia   (HCC)  2019-11: Essential  "hypertension      Past Surgical History:  Past Surgical History:   Procedure Laterality Date   • OTHER ABDOMINAL SURGERY      cholecystectomy        Allergies:  Patient has no known allergies.     Social History:  Social History     Tobacco Use   • Smoking status: Never Smoker   • Smokeless tobacco: Never Used   Substance Use Topics   • Alcohol use: No   • Drug use: No        Family History:   family history is not on file.      PHYSICAL EXAM:   OBJECTIVE:  Vital signs: /68   Pulse 72   Ht 1.6 m (5' 3\")   Wt 85.7 kg (189 lb)   SpO2 99%   BMI 33.48 kg/m²   GENERAL: Well-developed, well-nourished in no apparent distress.   EYE:  No ocular asymmetry, PERRLA  HENT: Pink, moist mucous membranes.    NECK: No thyromegaly.   CARDIOVASCULAR:  No murmurs  LUNGS: Clear breath sounds  ABDOMEN: Soft, nontender   EXTREMITIES: No clubbing, cyanosis, or edema.   NEUROLOGICAL: No gross focal motor abnormalities   LYMPH: No cervical adenopathy palpated.   SKIN: No rashes, lesions.     Labs:  Lab Results   Component Value Date/Time    HBA1C 8.0 (H) 10/09/2020 10:21 AM        Lab Results   Component Value Date/Time    WBC 8.4 10/05/2019 10:52 AM    RBC 5.34 10/05/2019 10:52 AM    HEMOGLOBIN 14.2 10/05/2019 10:52 AM    MCV 82.4 10/05/2019 10:52 AM    MCH 26.6 (L) 10/05/2019 10:52 AM    MCHC 32.3 (L) 10/05/2019 10:52 AM    RDW 37.2 10/05/2019 10:52 AM    MPV 10.6 10/05/2019 10:52 AM       Lab Results   Component Value Date/Time    SODIUM 140 10/09/2020 10:21 AM    POTASSIUM 4.6 10/09/2020 10:21 AM    CHLORIDE 105 10/09/2020 10:21 AM    CO2 26 10/09/2020 10:21 AM    ANION 9.0 10/09/2020 10:21 AM    GLUCOSE 146 (H) 10/09/2020 10:21 AM    BUN 20 10/09/2020 10:21 AM    CREATININE 0.67 10/09/2020 10:21 AM    CREATININE 0.7 04/03/2006 09:50 AM    CALCIUM 9.7 10/09/2020 10:21 AM    ASTSGOT 17 10/09/2020 10:21 AM    ALTSGPT 16 10/09/2020 10:21 AM    TBILIRUBIN 0.2 10/09/2020 10:21 AM    ALBUMIN 4.3 10/09/2020 10:21 AM    TOTPROTEIN " 7.0 10/09/2020 10:21 AM    GLOBULIN 2.7 10/09/2020 10:21 AM    AGRATIO 1.6 10/09/2020 10:21 AM       No results found for: CHOLSTRLTOT, TRIGLYCERIDE, HDL, LDL, CHOLHDLRAT, NONHDL    Lab Results   Component Value Date/Time    MALBCRT see below 10/09/2020 10:20 AM    MICROALBUR <1.2 10/09/2020 10:20 AM        No results found for: TSHULTRASEN  No results found for: FREEDIR  No results found for: FREET3  No results found for: THYSTIMIG        ASSESSMENT/PLAN:     1. Uncontrolled type 2 diabetes mellitus with hyperglycemia (HCC)  Uncontrolled secondary to hyperglycemia with improvement  A1c is better at 8.0%  Start glimepiride 2 mg with the largest meal  Continue Steglatro, Metformin and Actos at max doses  Recommend that she watch her carb intake and exercise regularly  Recommend that she get an updated eye exam  We will plan for follow-up in 3 months with labs      2. Essential hypertension  Well-controlled continue lisinopril  I am going to repeat her urine microalbumin with her next labs    3. Dyslipidemia  Uncontrolled  Recommend better compliance with atorvastatin  We will repeat her fasting lipids in 3 months    4. Long term (current) use of oral hypoglycemic drugs  Patient is on multiple oral agents for type 2 diabetes management      Return in about 3 months (around 1/16/2021).      This patient during there office visit today was started on a new medication.  Side effects of the new medication were discussed with the patient today in the office.     Thank you kindly for allowing me to participate in the diabetes care plan for this patient.    Jean Arredondo MD, FACE, [facility]  07/10/20    CC:   [email or web address]. (Inactive)

## 2020-11-10 ENCOUNTER — OFFICE VISIT (OUTPATIENT)
Dept: URGENT CARE | Facility: PHYSICIAN GROUP | Age: 55
End: 2020-11-10
Payer: COMMERCIAL

## 2020-11-10 VITALS
SYSTOLIC BLOOD PRESSURE: 132 MMHG | BODY MASS INDEX: 33.84 KG/M2 | HEIGHT: 63 IN | RESPIRATION RATE: 14 BRPM | WEIGHT: 191 LBS | TEMPERATURE: 97 F | OXYGEN SATURATION: 95 % | HEART RATE: 109 BPM | DIASTOLIC BLOOD PRESSURE: 80 MMHG

## 2020-11-10 DIAGNOSIS — B37.9 YEAST INFECTION: ICD-10-CM

## 2020-11-10 DIAGNOSIS — L30.9 DERMATITIS: ICD-10-CM

## 2020-11-10 DIAGNOSIS — E11.65 UNCONTROLLED TYPE 2 DIABETES MELLITUS WITH HYPERGLYCEMIA (HCC): ICD-10-CM

## 2020-11-10 LAB
APPEARANCE UR: NORMAL
BILIRUB UR STRIP-MCNC: NEGATIVE MG/DL
COLOR UR AUTO: YELLOW
GLUCOSE BLD-MCNC: 192 MG/DL (ref 70–100)
GLUCOSE UR STRIP.AUTO-MCNC: 100 MG/DL
HBA1C MFR BLD: 7.9 % (ref 0–5.6)
INT CON NEG: ABNORMAL
INT CON POS: ABNORMAL
KETONES UR STRIP.AUTO-MCNC: NEGATIVE MG/DL
LEUKOCYTE ESTERASE UR QL STRIP.AUTO: NORMAL
NITRITE UR QL STRIP.AUTO: NEGATIVE
PH UR STRIP.AUTO: 5 [PH] (ref 5–8)
PROT UR QL STRIP: NEGATIVE MG/DL
RBC UR QL AUTO: NEGATIVE
SP GR UR STRIP.AUTO: 1.02
UROBILINOGEN UR STRIP-MCNC: 0.2 MG/DL

## 2020-11-10 PROCEDURE — 82962 GLUCOSE BLOOD TEST: CPT | Performed by: PHYSICIAN ASSISTANT

## 2020-11-10 PROCEDURE — 81002 URINALYSIS NONAUTO W/O SCOPE: CPT | Performed by: PHYSICIAN ASSISTANT

## 2020-11-10 PROCEDURE — 83036 HEMOGLOBIN GLYCOSYLATED A1C: CPT | Performed by: PHYSICIAN ASSISTANT

## 2020-11-10 PROCEDURE — 99214 OFFICE O/P EST MOD 30 MIN: CPT | Performed by: PHYSICIAN ASSISTANT

## 2020-11-10 RX ORDER — CLOBETASOL PROPIONATE 0.5 MG/G
CREAM TOPICAL
Qty: 45 G | Refills: 0 | Status: SHIPPED | OUTPATIENT
Start: 2020-11-10 | End: 2021-06-27

## 2020-11-10 RX ORDER — FLUCONAZOLE 150 MG/1
150 TABLET ORAL
Qty: 3 TAB | Refills: 0 | Status: SHIPPED | OUTPATIENT
Start: 2020-11-10 | End: 2021-03-05

## 2020-11-10 ASSESSMENT — FIBROSIS 4 INDEX: FIB4 SCORE: 1.14

## 2020-11-11 ASSESSMENT — ENCOUNTER SYMPTOMS
POLYDIPSIA: 0
DOUBLE VISION: 0
MYALGIAS: 0
BLURRED VISION: 0
WHEEZING: 0
COUGH: 0
SENSORY CHANGE: 0
NAUSEA: 0
FOCAL WEAKNESS: 0
PALPITATIONS: 0
BACK PAIN: 0
BLOOD IN STOOL: 0
SPUTUM PRODUCTION: 0
HEADACHES: 0
FEVER: 0
TINGLING: 0
CONSTIPATION: 0
CHILLS: 0
SHORTNESS OF BREATH: 0
ABDOMINAL PAIN: 1
PHOTOPHOBIA: 0
VOMITING: 0
DIZZINESS: 0
NECK PAIN: 0
DIARRHEA: 0

## 2020-11-11 NOTE — PROGRESS NOTES
Subjective:   Rosalba Méndez is a 55 y.o. female who presents for Shaking (Glucose [id number], LUQ [id number], yeast [id number] ) and Rash (bilateral lower [id number] )      HPI  55 y.o. female with history of uncontrolled type 2 diabetes presents to urgent care with multiple complaints.  Patient reports she had an episode of low blood sugar measured 79 while at work today.  Patient reports her blood sugar is typically over 100.  She drinks some orange juice prior to arrival and her blood sugar is now 192.  She is being followed by endocrinology and primary care and states she has had recent change in medication for management of her diabetes.  She is also complaining of a rash over her lower extremities which she believes is related to her new medication.  She denies shortness of breath or difficulty breathing.  No history of previous drug allergies.  Patient also reports whitish vaginal discharge and vaginal irritation/itching which has been persistent over the last 2 weeks.  She reports she took 1 dose of Diflucan which was previously prescribed to her which helped improve her symptoms some.     Review of Systems   Constitutional: Negative for chills, fever and malaise/fatigue.   Eyes: Negative for blurred vision, double vision and photophobia.   Respiratory: Negative for cough, sputum production, shortness of breath and wheezing.    Cardiovascular: Negative for chest pain, palpitations and leg swelling.   Gastrointestinal: Positive for abdominal pain. Negative for blood in stool, constipation, diarrhea, nausea and vomiting.   Genitourinary:        Vaginal discharge and itching   Musculoskeletal: Negative for back pain, myalgias and neck pain.   Skin: Positive for itching and rash.   Neurological: Negative for dizziness, tingling, sensory change, focal weakness and headaches.   Endo/Heme/Allergies: Negative for polydipsia.   All other systems reviewed and are negative.      Patient Active Problem List  "  Diagnosis   • Uncontrolled type 2 diabetes mellitus with hyperglycemia (HCC)   • Essential hypertension   • Dyslipidemia   • Long term (current) use of oral hypoglycemic drugs     Past Surgical History:   Procedure Laterality Date   • OTHER ABDOMINAL SURGERY      cholecystectomy     Social History     Tobacco Use   • Smoking status: Never Smoker   • Smokeless tobacco: Never Used   Substance Use Topics   • Alcohol use: No   • Drug use: No      History reviewed. No pertinent family history.   (Allergies, Medications, & Tobacco/Substance Use were reconciled by the Medical Assistant and reviewed by myself. The family history is prepopulated)     Objective:     /80   Pulse (!) 109   Temp 36.1 °C (97 °F) (Temporal)   Resp 14   Ht 1.6 m (5' 3\")   Wt 86.6 kg (191 lb)   SpO2 95%   BMI 33.83 kg/m²     Physical Exam  Vitals signs reviewed.   Constitutional:       General: She is not in acute distress.     Appearance: Normal appearance. She is well-developed. She is not ill-appearing or diaphoretic.   HENT:      Head: Normocephalic and atraumatic.      Mouth/Throat:      Mouth: Mucous membranes are moist.      Pharynx: Oropharynx is clear.   Eyes:      Extraocular Movements: Extraocular movements intact.      Conjunctiva/sclera: Conjunctivae normal.      Pupils: Pupils are equal, round, and reactive to light.   Neck:      Musculoskeletal: Normal range of motion and neck supple.   Cardiovascular:      Rate and Rhythm: Normal rate and regular rhythm.   Pulmonary:      Effort: Pulmonary effort is normal. No respiratory distress.      Breath sounds: Normal breath sounds. No wheezing, rhonchi or rales.   Abdominal:      Palpations: Abdomen is soft.   Musculoskeletal: Normal range of motion.   Skin:     General: Skin is warm.          Neurological:      General: No focal deficit present.      Mental Status: She is alert and oriented to person, place, and time.   Psychiatric:         Mood and Affect: Mood normal.        "  Behavior: Behavior normal.         Thought Content: Thought content normal.         Judgment: Judgment normal.         Assessment/Plan:     1. Uncontrolled type 2 diabetes mellitus with hyperglycemia (HCC)  POCT Urinalysis    POCT Glucose    POCT  A1C   2. Yeast infection  fluconazole (DIFLUCAN) 150 MG tablet   3. Dermatitis  clobetasol (TEMOVATE) 0.05 % Cream       Results for orders placed or performed in visit on 11/10/20   POCT Urinalysis   Result Value Ref Range    POC Color yellow Negative    POC Appearance slightly cloudy Negative    POC Leukocyte Esterase trace Negative    POC Nitrites negative Negative    POC Urobiligen 0.2 Negative (0.2) mg/dL    POC Protein negative Negative mg/dL    POC Urine PH 5.0 5.0 - 8.0    POC Blood negative Negative    POC Specific Gravity 1.025 <1.005 - >1.030    POC Ketones negative Negative mg/dL    POC Bilirubin negative Negative mg/dL    POC Glucose 100 Negative mg/dL   POCT Glucose   Result Value Ref Range    Glucose - Accu-Ck 192 (A) 70 - 100 mg/dL   POCT  A1C   Result Value Ref Range    Glycohemoglobin 7.9 (A) 0.0 - 5.6 %    Internal Control Negative      Internal Control Positive       Patient symptoms are most likely due to recent change in medications for her type 2 diabetes management.  Recommend patient follow-up with her endocrinologist and primary care provider.  Patient is in no acute distress, she is alert and oriented x4, her mucous membranes are moist, and she has no current abdominal pain.  Patient given topical steroid to treat rash over bilateral lower extremities.    Also prescribed Diflucan for continued symptoms of whitish vaginal discharge and irritation.  Recommend patient follow-up with gynecology for further evaluation medical treatment plan.  Differential diagnosis, natural history, supportive care, and indications for immediate follow-up discussed.    Advised the patient to follow-up with the primary care physician for recheck, reevaluation, and  consideration of further management.  Patient verbalized understanding of treatment plan and has no further questions regarding care.     Please note that this dictation was created using voice recognition software. I have made a reasonable attempt to correct obvious errors, but I expect that there are errors of grammar and possibly content that I did not discover before finalizing the note.    This note was electronically signed by Patricia Key PA-C

## 2021-02-26 ENCOUNTER — HOSPITAL ENCOUNTER (OUTPATIENT)
Dept: LAB | Facility: MEDICAL CENTER | Age: 56
End: 2021-02-26
Attending: INTERNAL MEDICINE
Payer: COMMERCIAL

## 2021-02-26 ENCOUNTER — HOSPITAL ENCOUNTER (OUTPATIENT)
Dept: LAB | Facility: MEDICAL CENTER | Age: 56
End: 2021-02-26
Attending: PHYSICIAN ASSISTANT
Payer: COMMERCIAL

## 2021-02-26 DIAGNOSIS — E11.65 UNCONTROLLED TYPE 2 DIABETES MELLITUS WITH HYPERGLYCEMIA (HCC): ICD-10-CM

## 2021-02-26 DIAGNOSIS — I10 ESSENTIAL HYPERTENSION: ICD-10-CM

## 2021-02-26 DIAGNOSIS — Z79.84 LONG TERM (CURRENT) USE OF ORAL HYPOGLYCEMIC DRUGS: ICD-10-CM

## 2021-02-26 DIAGNOSIS — E78.5 DYSLIPIDEMIA: ICD-10-CM

## 2021-02-26 LAB
25(OH)D3 SERPL-MCNC: 31 NG/ML (ref 30–100)
ALBUMIN SERPL BCP-MCNC: 4.1 G/DL (ref 3.2–4.9)
ALBUMIN/GLOB SERPL: 1.4 G/DL
ALP SERPL-CCNC: 50 U/L (ref 30–99)
ALT SERPL-CCNC: 16 U/L (ref 2–50)
ANION GAP SERPL CALC-SCNC: 8 MMOL/L (ref 7–16)
AST SERPL-CCNC: 20 U/L (ref 12–45)
BILIRUB SERPL-MCNC: 0.3 MG/DL (ref 0.1–1.5)
BUN SERPL-MCNC: 20 MG/DL (ref 8–22)
CALCIUM SERPL-MCNC: 9.7 MG/DL (ref 8.5–10.5)
CHLORIDE SERPL-SCNC: 104 MMOL/L (ref 96–112)
CHOLEST SERPL-MCNC: 173 MG/DL (ref 100–199)
CO2 SERPL-SCNC: 27 MMOL/L (ref 20–33)
CREAT SERPL-MCNC: 0.66 MG/DL (ref 0.5–1.4)
CREAT UR-MCNC: 101.95 MG/DL
EST. AVERAGE GLUCOSE BLD GHB EST-MCNC: 171 MG/DL
FASTING STATUS PATIENT QL REPORTED: NORMAL
GLOBULIN SER CALC-MCNC: 2.9 G/DL (ref 1.9–3.5)
GLUCOSE SERPL-MCNC: 153 MG/DL (ref 65–99)
HBA1C MFR BLD: 7.6 % (ref 4–5.6)
HDLC SERPL-MCNC: 34 MG/DL
LDLC SERPL CALC-MCNC: 105 MG/DL
MICROALBUMIN UR-MCNC: <1.2 MG/DL
MICROALBUMIN/CREAT UR: NORMAL MG/G (ref 0–30)
POTASSIUM SERPL-SCNC: 4.7 MMOL/L (ref 3.6–5.5)
PROT SERPL-MCNC: 7 G/DL (ref 6–8.2)
SODIUM SERPL-SCNC: 139 MMOL/L (ref 135–145)
TRIGL SERPL-MCNC: 169 MG/DL (ref 0–149)

## 2021-02-26 PROCEDURE — 80061 LIPID PANEL: CPT

## 2021-02-26 PROCEDURE — 36415 COLL VENOUS BLD VENIPUNCTURE: CPT

## 2021-02-26 PROCEDURE — 82570 ASSAY OF URINE CREATININE: CPT

## 2021-02-26 PROCEDURE — 82306 VITAMIN D 25 HYDROXY: CPT

## 2021-02-26 PROCEDURE — 83036 HEMOGLOBIN GLYCOSYLATED A1C: CPT

## 2021-02-26 PROCEDURE — 82043 UR ALBUMIN QUANTITATIVE: CPT

## 2021-02-26 PROCEDURE — 80053 COMPREHEN METABOLIC PANEL: CPT

## 2021-03-05 ENCOUNTER — OFFICE VISIT (OUTPATIENT)
Dept: ENDOCRINOLOGY | Facility: MEDICAL CENTER | Age: 56
End: 2021-03-05
Attending: INTERNAL MEDICINE
Payer: COMMERCIAL

## 2021-03-05 VITALS
SYSTOLIC BLOOD PRESSURE: 122 MMHG | BODY MASS INDEX: 35.61 KG/M2 | HEART RATE: 74 BPM | OXYGEN SATURATION: 100 % | DIASTOLIC BLOOD PRESSURE: 80 MMHG | HEIGHT: 63 IN | WEIGHT: 201 LBS

## 2021-03-05 DIAGNOSIS — I10 ESSENTIAL HYPERTENSION: ICD-10-CM

## 2021-03-05 DIAGNOSIS — Z79.84 LONG TERM (CURRENT) USE OF ORAL HYPOGLYCEMIC DRUGS: ICD-10-CM

## 2021-03-05 DIAGNOSIS — E11.65 UNCONTROLLED TYPE 2 DIABETES MELLITUS WITH HYPERGLYCEMIA (HCC): ICD-10-CM

## 2021-03-05 DIAGNOSIS — E78.5 DYSLIPIDEMIA: ICD-10-CM

## 2021-03-05 PROCEDURE — 99214 OFFICE O/P EST MOD 30 MIN: CPT | Performed by: INTERNAL MEDICINE

## 2021-03-05 PROCEDURE — 99212 OFFICE O/P EST SF 10 MIN: CPT | Performed by: INTERNAL MEDICINE

## 2021-03-05 RX ORDER — SEMAGLUTIDE 1.34 MG/ML
0.5 INJECTION, SOLUTION SUBCUTANEOUS
Qty: 1 EACH | Refills: 11 | Status: SHIPPED | OUTPATIENT
Start: 2021-03-05 | End: 2021-06-27

## 2021-03-05 RX ORDER — PIOGLITAZONEHYDROCHLORIDE 30 MG/1
30 TABLET ORAL DAILY
COMMUNITY
End: 2021-03-05 | Stop reason: SDUPTHER

## 2021-03-05 RX ORDER — PIOGLITAZONEHYDROCHLORIDE 30 MG/1
30 TABLET ORAL DAILY
Qty: 90 TABLET | Refills: 3 | Status: SHIPPED | OUTPATIENT
Start: 2021-03-05

## 2021-03-05 RX ORDER — GLIMEPIRIDE 2 MG/1
2 TABLET ORAL EVERY MORNING
COMMUNITY
End: 2021-11-29

## 2021-03-05 RX ORDER — ATORVASTATIN CALCIUM 20 MG/1
20 TABLET, FILM COATED ORAL DAILY
Qty: 30 TABLET | Refills: 11 | Status: SHIPPED | OUTPATIENT
Start: 2021-03-05 | End: 2021-06-27

## 2021-03-05 ASSESSMENT — FIBROSIS 4 INDEX: FIB4 SCORE: 1.37

## 2021-03-05 ASSESSMENT — PATIENT HEALTH QUESTIONNAIRE - PHQ9: CLINICAL INTERPRETATION OF PHQ2 SCORE: 0

## 2021-03-05 NOTE — PROGRESS NOTES
CHIEF COMPLAINT: Patient is here for follow up of Type 2 Diabetes Mellitus    HPI:     Rosalba Méndez is a 55 y.o. female with Type 2 Diabetes Mellitus here for follow up.    Labs from 2021 show A1c is slightly better at 7.6%  Labs from 2020 show A1c was 8.0%  Previously her A1c was 8.4% back on 2020      Prior patient of Ellis Hernandez who started seeing him in . Patients A1C at that time was 10.1%. at that time patient was on:   1.  Metformin  1000mg BID   2.  Glimepiride       He had her START on the followin.  Steglatro   2.  Metformin  1000mg   3.  Trulicity 0.75 once a week and in 4 weeks increase to 1.5     Patient was started on Trulicity once a week samples 2019 and lab results showed a dramatic decrease in her A1C to 8.4 on 20.     At that time our office requested a prior authorization for Trulicity to be covered. The authorization was denied so we continued to provide the patient with samples because of her positive clinical response to this new therapy. Based off of patients current insurance formulary we are switching her to Ozempic.     We need insurance to approve the Ozempic so patient can continue to decrease her A1C since patients A1C was 10.1 without the Trulicity. Patient meets criteria for approval of the Ozempic.           She is currently on Steglatro 15 mg daily (stopped), Metformin 1000 mg twice a day plus Actos 30 mg daily, and glimepiride 2 mg daily      She informed me today that she stopped taking the SGLT2 inhibitor that is covered by her insurance because of recurrent yeast infections      She has essential hypertension is taking lisinopril 10 mg daily  She does not have diabetic kidney disease based on her most recent urine microalbumin from 2021      She has hyperlipidemia at baseline I started her on atorvastatin just recently  She admits to noncompliance however  Her LDL cholesterol improved from 118 down to  "105 with triglycerides of 169 and HDL of 34 on February 2021      BG Diary:  Patient did not bring glucose meter despite repeated request to do so    Weight has been stable    Diabetes Complications   Retinopathy: No known retinopathy.  Last eye exam: I am referring her to ophthalmology  Neuropathy: Denies paresthesias or numbness in hands or feet. Denies any foot wounds.  Exercise: Minimal.  Diet: Fair.  Patient's medications, allergies, and social histories were reviewed and updated as appropriate.    ROS:     CONS:     No fever, no chills   EYES:     No diplopia, no blurry vision   CV:           No chest pain, no palpitations   PULM:     No SOB, no cough, no hemoptysis.   GI:            No nausea, no vomiting, no diarrhea, no constipation   ENDO:     No polyuria, no polydipsia, no heat intolerance, no cold intolerance       Past Medical History:  Problem List:  2020-07: Dyslipidemia  2020-07: Long term (current) use of oral hypoglycemic drugs  2019-11: Uncontrolled type 2 diabetes mellitus with hyperglycemia   (HCC)  2019-11: Essential hypertension      Past Surgical History:  Past Surgical History:   Procedure Laterality Date   • OTHER ABDOMINAL SURGERY      cholecystectomy        Allergies:  Patient has no known allergies.     Social History:  Social History     Tobacco Use   • Smoking status: Never Smoker   • Smokeless tobacco: Never Used   Substance Use Topics   • Alcohol use: No   • Drug use: No        Family History:   family history is not on file.      PHYSICAL EXAM:   OBJECTIVE:  Vital signs: /80   Pulse 74   Ht 1.6 m (5' 3\")   Wt 91.2 kg (201 lb)   SpO2 100%   BMI 35.61 kg/m²   GENERAL: Obese female in no apparent distress.   EYE:  No ocular asymmetry, PERRLA  HENT: Pink, moist mucous membranes.    NECK: No thyromegaly.   CARDIOVASCULAR:  No murmurs  LUNGS: Clear breath sounds  ABDOMEN: Soft, nontender   EXTREMITIES: No clubbing, cyanosis, or edema.   NEUROLOGICAL: No gross focal motor " abnormalities   LYMPH: No cervical adenopathy palpated.   SKIN: No rashes, lesions.     Labs:  Lab Results   Component Value Date/Time    HBA1C 7.6 (H) 02/26/2021 09:54 AM        Lab Results   Component Value Date/Time    WBC 8.4 10/05/2019 10:52 AM    RBC 5.34 10/05/2019 10:52 AM    HEMOGLOBIN 14.2 10/05/2019 10:52 AM    MCV 82.4 10/05/2019 10:52 AM    MCH 26.6 (L) 10/05/2019 10:52 AM    MCHC 32.3 (L) 10/05/2019 10:52 AM    RDW 37.2 10/05/2019 10:52 AM    MPV 10.6 10/05/2019 10:52 AM       Lab Results   Component Value Date/Time    SODIUM 139 02/26/2021 09:54 AM    POTASSIUM 4.7 02/26/2021 09:54 AM    CHLORIDE 104 02/26/2021 09:54 AM    CO2 27 02/26/2021 09:54 AM    ANION 8.0 02/26/2021 09:54 AM    GLUCOSE 153 (H) 02/26/2021 09:54 AM    BUN 20 02/26/2021 09:54 AM    CREATININE 0.66 02/26/2021 09:54 AM    CREATININE 0.7 04/03/2006 09:50 AM    CALCIUM 9.7 02/26/2021 09:54 AM    ASTSGOT 20 02/26/2021 09:54 AM    ALTSGPT 16 02/26/2021 09:54 AM    TBILIRUBIN 0.3 02/26/2021 09:54 AM    ALBUMIN 4.1 02/26/2021 09:54 AM    TOTPROTEIN 7.0 02/26/2021 09:54 AM    GLOBULIN 2.9 02/26/2021 09:54 AM    AGRATIO 1.4 02/26/2021 09:54 AM       No results found for: CHOLSTRLTOT, TRIGLYCERIDE, HDL, LDL, CHOLHDLRAT, NONHDL    Lab Results   Component Value Date/Time    MALBCRT see below 02/26/2021 09:54 AM    MICROALBUR <1.2 02/26/2021 09:54 AM        No results found for: TSHULTRASEN  No results found for: FREEDIR  No results found for: FREET3  No results found for: THYSTIMIG        ASSESSMENT/PLAN:     1. Uncontrolled type 2 diabetes mellitus with hyperglycemia (HCC)  Uncontrolled secondary to hyperglycemia with improvement  A1c is better at 7.6%    Based off of patients current insurance formulary we are switching her to Ozempic.     Start Ozempic 0.25 mg weekly to be adjusted after 1 month to 0.5 mg weekly    We need insurance to approve the Ozempic so patient can continue to decrease her A1C since patients A1C was 10.1 without the  Trulicity. Patient meets criteria for approval of the Ozempic.     She has failed Metformin and did well with samples of Ozempic and samples of Trulicity    Stop SGLT2 inhibitor because of recurrent yeast infections  Continue glimepiride 2 mg daily  Advised patient that she should discontinue sulfonylurea or glimepiride if she develops hypoglycemia while on Ozempic  Continue Metformin and Actos  Recommend that she watch her carb intake and exercise regularly  Recommend that she get an updated eye exam  We will plan for follow-up in 3 months with labs      2. Essential hypertension  Well-controlled continue lisinopril    3. Dyslipidemia  Unstable  Recommend better compliance with atorvastatin  Repeat fasting lipids in 6 months to assess compliance    4. Long term (current) use of oral hypoglycemic drugs  Patient is on multiple oral agents for type 2 diabetes management      Return in about 3 months (around 6/5/2021).      This patient during there office visit today was started on a new medication.  Side effects of the new medication were discussed with the patient today in the office.     Thank you kindly for allowing me to participate in the diabetes care plan for this patient.    Jean Arredondo MD, FACE, ECNU  07/10/20    CC:   VAHE Dyer (Inactive)

## 2021-03-05 NOTE — PROGRESS NOTES
RN-CDE Note    Subjective:   Endocrinology Clinic Progress Note  PCP: BROOK Chairez    HPI:  Rosalba Méndez is a 56 y.o. old patient who is seen today for review of type 2 Diabetes.  She had COVID-19 and has had a salty taste.  DM:   Last A1c:   Lab Results   Component Value Date/Time    HBA1C 7.6 (H) 02/26/2021 09:54 AM        A1C GOAL: < 7    Diabetes Medications:   Off Steglatro due to yeast infection and itching.  Taking above medications as prescribed: yes  Taking daily ASA: No    Exercise: no regular exercise, sedentary  Diet: Plate method encouraged  Patient's body mass index is 35.61 kg/m². Exercise and nutrition counseling were performed at this visit.    Glucose monitoring frequency: Testing blood sugars daily  120-220  Hypoglycemic episodes: yes - felt low at 90's  Last Retinal Exam: on file and up-to-date  Daily Foot Exam: Yes   Foot Exam:  Monofilament: done  Monofilament testing with a 10 gram force: sensation intact: intact bilaterally  Visual Inspection: Feet without maceration, ulcers, fissures.  Pedal pulses: intact bilaterally   Lab Results   Component Value Date/Time    MALBCRT see below 02/26/2021 09:54 AM    MICROALBUR <1.2 02/26/2021 09:54 AM      ACR Albumin/Creatinine Ratio goal <30   Currently Rx ACE/ARB: Yes   Dyslipidemia:  Lab Results   Component Value Date/Time    CHOLSTRLTOT 173 02/26/2021 09:54 AM     (H) 02/26/2021 09:54 AM    HDL 34 (A) 02/26/2021 09:54 AM    TRIGLYCERIDE 169 (H) 02/26/2021 09:54 AM       Currently Rx Statin: No     She  reports that she has never smoked. She has never used smokeless tobacco.      Plan:     Discussed and educated on:   - All medications, side effects and compliance (discussed carefully)  - Annual eye examinations at Ophthalmology  - Home glucose monitoring emphasized  - Weight control and daily exercise    Recommended medication changes: She has gained weight but also feels bloated.

## 2021-03-08 ENCOUNTER — TELEPHONE (OUTPATIENT)
Dept: ENDOCRINOLOGY | Facility: MEDICAL CENTER | Age: 56
End: 2021-03-08

## 2021-03-08 NOTE — TELEPHONE ENCOUNTER
Started a prior authorization for patients Ozempic. Submitted to [facility] (Key: [id number]).  I submitted as an urgent request.

## 2021-03-15 DIAGNOSIS — Z23 NEED FOR VACCINATION: ICD-10-CM

## 2021-03-24 NOTE — TELEPHONE ENCOUNTER
Medication was approved 3/12/21 to 3/12/22. Patient has a high deductible plan and her co-pay is high $821.72. I tried to contact her about financial assistance and she did not return my call.

## 2021-06-27 ENCOUNTER — HOSPITAL ENCOUNTER (EMERGENCY)
Facility: MEDICAL CENTER | Age: 56
End: 2021-06-27
Attending: EMERGENCY MEDICINE
Payer: COMMERCIAL

## 2021-06-27 ENCOUNTER — APPOINTMENT (OUTPATIENT)
Dept: RADIOLOGY | Facility: MEDICAL CENTER | Age: 56
End: 2021-06-27
Attending: EMERGENCY MEDICINE
Payer: COMMERCIAL

## 2021-06-27 VITALS
RESPIRATION RATE: 18 BRPM | BODY MASS INDEX: 36.33 KG/M2 | TEMPERATURE: 98 F | HEART RATE: 60 BPM | OXYGEN SATURATION: 95 % | SYSTOLIC BLOOD PRESSURE: 150 MMHG | WEIGHT: 205.03 LBS | DIASTOLIC BLOOD PRESSURE: 80 MMHG | HEIGHT: 63 IN

## 2021-06-27 DIAGNOSIS — N39.0 ACUTE UTI: ICD-10-CM

## 2021-06-27 DIAGNOSIS — R10.9 FLANK PAIN: ICD-10-CM

## 2021-06-27 LAB
ALBUMIN SERPL BCP-MCNC: 4.2 G/DL (ref 3.2–4.9)
ALBUMIN/GLOB SERPL: 1.6 G/DL
ALP SERPL-CCNC: 53 U/L (ref 30–99)
ALT SERPL-CCNC: 14 U/L (ref 2–50)
ANION GAP SERPL CALC-SCNC: 10 MMOL/L (ref 7–16)
APPEARANCE UR: ABNORMAL
AST SERPL-CCNC: 12 U/L (ref 12–45)
BACTERIA #/AREA URNS HPF: ABNORMAL /HPF
BASOPHILS # BLD AUTO: 0.3 % (ref 0–1.8)
BASOPHILS # BLD: 0.03 K/UL (ref 0–0.12)
BILIRUB SERPL-MCNC: 0.2 MG/DL (ref 0.1–1.5)
BILIRUB UR QL STRIP.AUTO: NEGATIVE
BUN SERPL-MCNC: 22 MG/DL (ref 8–22)
CALCIUM SERPL-MCNC: 9.2 MG/DL (ref 8.4–10.2)
CHLORIDE SERPL-SCNC: 103 MMOL/L (ref 96–112)
CO2 SERPL-SCNC: 26 MMOL/L (ref 20–33)
COLOR UR: YELLOW
CREAT SERPL-MCNC: 0.63 MG/DL (ref 0.5–1.4)
EOSINOPHIL # BLD AUTO: 0.03 K/UL (ref 0–0.51)
EOSINOPHIL NFR BLD: 0.3 % (ref 0–6.9)
EPI CELLS #/AREA URNS HPF: ABNORMAL /HPF
ERYTHROCYTE [DISTWIDTH] IN BLOOD BY AUTOMATED COUNT: 39.8 FL (ref 35.9–50)
EST. AVERAGE GLUCOSE BLD GHB EST-MCNC: 174 MG/DL
GLOBULIN SER CALC-MCNC: 2.7 G/DL (ref 1.9–3.5)
GLUCOSE SERPL-MCNC: 171 MG/DL (ref 65–99)
GLUCOSE UR STRIP.AUTO-MCNC: NEGATIVE MG/DL
HBA1C MFR BLD: 7.7 % (ref 4–5.6)
HCT VFR BLD AUTO: 43.6 % (ref 37–47)
HGB BLD-MCNC: 14 G/DL (ref 12–16)
HYALINE CASTS #/AREA URNS LPF: ABNORMAL /LPF
IMM GRANULOCYTES # BLD AUTO: 0.06 K/UL (ref 0–0.11)
IMM GRANULOCYTES NFR BLD AUTO: 0.6 % (ref 0–0.9)
KETONES UR STRIP.AUTO-MCNC: NEGATIVE MG/DL
LEUKOCYTE ESTERASE UR QL STRIP.AUTO: ABNORMAL
LIPASE SERPL-CCNC: 28 U/L (ref 7–58)
LYMPHOCYTES # BLD AUTO: 1.94 K/UL (ref 1–4.8)
LYMPHOCYTES NFR BLD: 20.9 % (ref 22–41)
MCH RBC QN AUTO: 27.1 PG (ref 27–33)
MCHC RBC AUTO-ENTMCNC: 32.1 G/DL (ref 33.6–35)
MCV RBC AUTO: 84.5 FL (ref 81.4–97.8)
MICRO URNS: ABNORMAL
MONOCYTES # BLD AUTO: 0.51 K/UL (ref 0–0.85)
MONOCYTES NFR BLD AUTO: 5.5 % (ref 0–13.4)
MUCOUS THREADS #/AREA URNS HPF: ABNORMAL /HPF
NEUTROPHILS # BLD AUTO: 6.7 K/UL (ref 2–7.15)
NEUTROPHILS NFR BLD: 72.4 % (ref 44–72)
NITRITE UR QL STRIP.AUTO: NEGATIVE
NRBC # BLD AUTO: 0 K/UL
NRBC BLD-RTO: 0 /100 WBC
PH UR STRIP.AUTO: 5.5 [PH] (ref 5–8)
PLATELET # BLD AUTO: 219 K/UL (ref 164–446)
PMV BLD AUTO: 10.7 FL (ref 9–12.9)
POTASSIUM SERPL-SCNC: 4.3 MMOL/L (ref 3.6–5.5)
PROT SERPL-MCNC: 6.9 G/DL (ref 6–8.2)
PROT UR QL STRIP: NEGATIVE MG/DL
RBC # BLD AUTO: 5.16 M/UL (ref 4.2–5.4)
RBC # URNS HPF: ABNORMAL /HPF
RBC UR QL AUTO: NEGATIVE
SODIUM SERPL-SCNC: 139 MMOL/L (ref 135–145)
SP GR UR STRIP.AUTO: >=1.03
WBC # BLD AUTO: 9.3 K/UL (ref 4.8–10.8)
WBC #/AREA URNS HPF: ABNORMAL /HPF

## 2021-06-27 PROCEDURE — 96365 THER/PROPH/DIAG IV INF INIT: CPT

## 2021-06-27 PROCEDURE — 80053 COMPREHEN METABOLIC PANEL: CPT

## 2021-06-27 PROCEDURE — 96375 TX/PRO/DX INJ NEW DRUG ADDON: CPT

## 2021-06-27 PROCEDURE — 700105 HCHG RX REV CODE 258: Performed by: EMERGENCY MEDICINE

## 2021-06-27 PROCEDURE — A9270 NON-COVERED ITEM OR SERVICE: HCPCS | Performed by: EMERGENCY MEDICINE

## 2021-06-27 PROCEDURE — 83690 ASSAY OF LIPASE: CPT

## 2021-06-27 PROCEDURE — 83036 HEMOGLOBIN GLYCOSYLATED A1C: CPT

## 2021-06-27 PROCEDURE — 85025 COMPLETE CBC W/AUTO DIFF WBC: CPT

## 2021-06-27 PROCEDURE — 87086 URINE CULTURE/COLONY COUNT: CPT

## 2021-06-27 PROCEDURE — 99284 EMERGENCY DEPT VISIT MOD MDM: CPT

## 2021-06-27 PROCEDURE — 700102 HCHG RX REV CODE 250 W/ 637 OVERRIDE(OP): Performed by: EMERGENCY MEDICINE

## 2021-06-27 PROCEDURE — 700111 HCHG RX REV CODE 636 W/ 250 OVERRIDE (IP): Performed by: EMERGENCY MEDICINE

## 2021-06-27 PROCEDURE — 74176 CT ABD & PELVIS W/O CONTRAST: CPT | Mod: ME

## 2021-06-27 PROCEDURE — 81001 URINALYSIS AUTO W/SCOPE: CPT

## 2021-06-27 RX ORDER — SODIUM CHLORIDE 9 MG/ML
1000 INJECTION, SOLUTION INTRAVENOUS ONCE
Status: COMPLETED | OUTPATIENT
Start: 2021-06-27 | End: 2021-06-27

## 2021-06-27 RX ORDER — KETOROLAC TROMETHAMINE 30 MG/ML
15 INJECTION, SOLUTION INTRAMUSCULAR; INTRAVENOUS ONCE
Status: COMPLETED | OUTPATIENT
Start: 2021-06-27 | End: 2021-06-27

## 2021-06-27 RX ORDER — METHOCARBAMOL 500 MG/1
500 TABLET, FILM COATED ORAL ONCE
Status: COMPLETED | OUTPATIENT
Start: 2021-06-27 | End: 2021-06-27

## 2021-06-27 RX ORDER — HYDROCODONE BITARTRATE AND ACETAMINOPHEN 5; 325 MG/1; MG/1
1 TABLET ORAL ONCE
Status: COMPLETED | OUTPATIENT
Start: 2021-06-27 | End: 2021-06-27

## 2021-06-27 RX ORDER — SULFAMETHOXAZOLE AND TRIMETHOPRIM 800; 160 MG/1; MG/1
1 TABLET ORAL 2 TIMES DAILY
Qty: 14 TABLET | Refills: 0 | Status: SHIPPED | OUTPATIENT
Start: 2021-06-27 | End: 2021-07-04

## 2021-06-27 RX ORDER — METHOCARBAMOL 750 MG/1
750 TABLET, FILM COATED ORAL 4 TIMES DAILY
Qty: 12 TABLET | Refills: 0 | Status: SHIPPED | OUTPATIENT
Start: 2021-06-27 | End: 2021-06-30

## 2021-06-27 RX ORDER — ONDANSETRON 2 MG/ML
4 INJECTION INTRAMUSCULAR; INTRAVENOUS ONCE
Status: COMPLETED | OUTPATIENT
Start: 2021-06-27 | End: 2021-06-27

## 2021-06-27 RX ORDER — LISINOPRIL 20 MG/1
20 TABLET ORAL EVERY EVENING
COMMUNITY

## 2021-06-27 RX ADMIN — ONDANSETRON 4 MG: 2 INJECTION INTRAMUSCULAR; INTRAVENOUS at 09:43

## 2021-06-27 RX ADMIN — KETOROLAC TROMETHAMINE 15 MG: 30 INJECTION, SOLUTION INTRAMUSCULAR; INTRAVENOUS at 09:43

## 2021-06-27 RX ADMIN — METHOCARBAMOL 500 MG: 500 TABLET ORAL at 12:04

## 2021-06-27 RX ADMIN — CEFTRIAXONE SODIUM 2 G: 2 INJECTION, POWDER, FOR SOLUTION INTRAMUSCULAR; INTRAVENOUS at 12:01

## 2021-06-27 RX ADMIN — HYDROCODONE BITARTRATE AND ACETAMINOPHEN 1 TABLET: 5; 325 TABLET ORAL at 11:26

## 2021-06-27 RX ADMIN — SODIUM CHLORIDE 1000 ML: 9 INJECTION, SOLUTION INTRAVENOUS at 09:43

## 2021-06-27 ASSESSMENT — PAIN DESCRIPTION - PAIN TYPE: TYPE: ACUTE PAIN

## 2021-06-27 ASSESSMENT — PAIN DESCRIPTION - DESCRIPTORS: DESCRIPTORS: ACHING

## 2021-06-27 ASSESSMENT — FIBROSIS 4 INDEX: FIB4 SCORE: 1.37

## 2021-06-27 NOTE — ED NOTES
Med rec updated and complete  Allergies reviewed  Pt reports no vitamins   Pt reports no antibiotics in the last 30 days    No current facility-administered medications on file prior to encounter.     Current Outpatient Medications on File Prior to Encounter   Medication Sig Dispense Refill   • lisinopril (PRINIVIL) 20 MG Tab Take 20 mg by mouth every evening.     • glimepiride (AMARYL) 2 MG Tab Take 2 mg by mouth every morning.     • metformin (GLUCOPHAGE) 1000 MG tablet Take 1 tablet by mouth twice daily 90 days 180 tablet 3   • pioglitazone (ACTOS) 30 MG Tab Take 1 tablet by mouth every day. 90 tablet 3   • acetaminophen (TYLENOL) 500 MG Tab Take 1,000 mg by mouth every day.     • ibuprofen (MOTRIN) 600 MG Tab Take 600 mg by mouth every 8 hours as needed for Moderate Pain.

## 2021-06-27 NOTE — ED TRIAGE NOTES
"Presents accompanied by family.  Pt reports a hx of nephrolithiasis.  C/O acute onset of left flank pain with associated nausea episodically recurring for the past 4 days.   Chief Complaint   Patient presents with   • Flank Pain   • Nausea     BP (!) 186/93   Pulse 66   Temp 36 °C (96.8 °F) (Temporal)   Resp 20   Ht 1.6 m (5' 3\")   Wt 93 kg (205 lb 0.4 oz)   SpO2 98%   BMI 36.32 kg/m²      "

## 2021-06-27 NOTE — ED NOTES
Pt for d/c instructions reviewed with pt, aware of no driving due to meds recvd in er. To  meds at [facility] in [place], f/u with pcp and endo, return for worsening s/s

## 2021-06-27 NOTE — ED NOTES
"Assumed care of pt-in no apparent distress, though states pain 9/10 to \"my kidney\", though left buttock  Is what pt is rubbing as well as radiation to leg. Update to erp.  "

## 2021-06-27 NOTE — ED PROVIDER NOTES
ED Provider Note  CHIEF COMPLAINT  Chief Complaint   Patient presents with   • Flank Pain   • Nausea       HPI  Rosalba Méndez is a 56 y.o. female who presents to the ER with complaint of left flank pain which began 4 days ago.  She says the pain waxes and wanes in intensity.  It feels like kidney stone pain that she is had in the past.  She also describes a sense of frequency and urgency of urination.  No hematuria.  No fevers or chills.  She is had some nausea but no vomiting.  Pain is mostly left-sided.  It radiates across to the right side and occasionally into the left lower quadrant of the abdomen.  She especially notices the left lower quadrant and suprapubic abdominal pain with urination.  No numbness or tingling in the leg.  No loss of bowel or bladder control.  No weakness of the leg.  No trauma or injury.  She is currently on a Medrol Dosepak.  She saw her nurse practitioner couple days ago for this back pain and had an x-ray done.  The x-ray did not show any ureteral stone but did show degenerative change of the spine so the nurse practitioner felt it was a musculoskeletal cause of pain and put her on some Medrol Dosepak as patient does describe a little bit of radiating pain into the left thigh.  No rash.  Patient is a diabetic.  She is due to see her endocrinologist this coming week.  Patient reports that the back pain gets worse with certain movements.     REVIEW OF SYSTEMS  See HPI for further details.  Positive for suprapubic pain with urination, frequency and urgency of urination, left-sided low back pain with radiation into the right low back, nausea.  Negative for fever, chills, vomiting, hematuria, trauma, injury, radiating pain down the leg, numbness, tingling, weakness.   All other systems are negative.    PAST MEDICAL HISTORY  Past Medical History:   Diagnosis Date   • Diabetes    • Hypertension        FAMILY HISTORY  History reviewed. No pertinent family history.    SOCIAL  "HISTORY  Social History     Socioeconomic History   • Marital status:      Spouse name: Not on file   • Number of children: Not on file   • Years of education: Not on file   • Highest education level: Not on file   Occupational History   • Not on file   Tobacco Use   • Smoking status: Never Smoker   • Smokeless tobacco: Never Used   Vaping Use   • Vaping Use: Never used   Substance and Sexual Activity   • Alcohol use: No   • Drug use: No   • Sexual activity: Not on file   Other Topics Concern   • Not on file   Social History Narrative   • Not on file     Social Determinants of Health     Financial Resource Strain:    • Difficulty of Paying Living Expenses:    Food Insecurity:    • Worried About Running Out of Food in the Last Year:    • Ran Out of Food in the Last Year:    Transportation Needs:    • Lack of Transportation (Medical):    • Lack of Transportation (Non-Medical):    Physical Activity:    • Days of Exercise per Week:    • Minutes of Exercise per Session:    Stress:    • Feeling of Stress :    Social Connections:    • Frequency of Communication with Friends and Family:    • Frequency of Social Gatherings with Friends and Family:    • Attends Orthodoxy Services:    • Active Member of Clubs or Organizations:    • Attends Club or Organization Meetings:    • Marital Status:    Intimate Partner Violence:    • Fear of Current or Ex-Partner:    • Emotionally Abused:    • Physically Abused:    • Sexually Abused:        SURGICAL HISTORY  Past Surgical History:   Procedure Laterality Date   • OTHER ABDOMINAL SURGERY      cholecystectomy       CURRENT MEDICATIONS  Home Medications    **Home medications have not yet been reviewed for this encounter**         ALLERGIES  No Known Allergies    PHYSICAL EXAM  VITAL SIGNS: BP (!) 189/93   Pulse 60   Temp 36 °C (96.8 °F) (Temporal)   Resp 20   Ht 1.6 m (5' 3\")   Wt 93 kg (205 lb 0.4 oz)   SpO2 98%   BMI 36.32 kg/m²      Constitutional: Well developed, well " nourished; No acute distress; elevated BMI non-toxic appearance.   HENT: Normocephalic, atraumatic; Bilateral external ears normal; oropharyngeal examination deferred due to COVID-19 outbreak and lack of oropharyngeal complaint.  Eyes: PERRL, EOMI, Conjunctiva normal. No discharge.   Neck:  Supple, nontender midline; No stridor; No nuchal rigidity.   Lymphatic: No cervical lymphadenopathy noted.   Cardiovascular: Regular rate and rhythm without murmurs, rubs, or gallop.   Thorax & Lungs: No respiratory distress, breath sounds clear to auscultation bilaterally without wheezing, rales or rhonchi. Nontender chest wall. No crepitus or subcutaneous air  Abdomen: Soft, nontender, bowel sounds normal. No obvious masses; No pulsatile masses; no rebound, guarding, or peritoneal signs.   Skin: Good color; warm and dry without rash or petechia.  Back: Nontender, No CVA tenderness.   Extremities: Distal radial, dorsalis pedis, posterior tibial pulses are equal bilaterally; No edema; Nontender calves or saphenous, No cyanosis, No clubbing.   Musculoskeletal: Good range of motion in all major joints. No tenderness to palpation or major deformities noted.   Neurologic: Alert & oriented x 4, clear speech      RADIOLOGY/PROCEDURES  CT-RENAL COLIC EVALUATION(A/P W/O)   Final Result      1.  No renal stone or hydronephrosis.   2.  Normal appendix.   3.  No focal mesenteric inflammatory process.          COURSE & MEDICAL DECISION MAKING  Pertinent Labs & Imaging studies reviewed. (See chart for details)  Results for orders placed or performed during the hospital encounter of 06/27/21   CBC WITH DIFFERENTIAL   Result Value Ref Range    WBC 9.3 4.8 - 10.8 K/uL    RBC 5.16 4.20 - 5.40 M/uL    Hemoglobin 14.0 12.0 - 16.0 g/dL    Hematocrit 43.6 37.0 - 47.0 %    MCV 84.5 81.4 - 97.8 fL    MCH 27.1 27.0 - 33.0 pg    MCHC 32.1 (L) 33.6 - 35.0 g/dL    RDW 39.8 35.9 - 50.0 fL    Platelet Count 219 164 - 446 K/uL    MPV 10.7 9.0 - 12.9 fL     Neutrophils-Polys 72.40 (H) 44.00 - 72.00 %    Lymphocytes 20.90 (L) 22.00 - 41.00 %    Monocytes 5.50 0.00 - 13.40 %    Eosinophils 0.30 0.00 - 6.90 %    Basophils 0.30 0.00 - 1.80 %    Immature Granulocytes 0.60 0.00 - 0.90 %    Nucleated RBC 0.00 /100 WBC    Neutrophils (Absolute) 6.70 2.00 - 7.15 K/uL    Lymphs (Absolute) 1.94 1.00 - 4.80 K/uL    Monos (Absolute) 0.51 0.00 - 0.85 K/uL    Eos (Absolute) 0.03 0.00 - 0.51 K/uL    Baso (Absolute) 0.03 0.00 - 0.12 K/uL    Immature Granulocytes (abs) 0.06 0.00 - 0.11 K/uL    NRBC (Absolute) 0.00 K/uL   COMP METABOLIC PANEL   Result Value Ref Range    Sodium 139 135 - 145 mmol/L    Potassium 4.3 3.6 - 5.5 mmol/L    Chloride 103 96 - 112 mmol/L    Co2 26 20 - 33 mmol/L    Anion Gap 10.0 7.0 - 16.0    Glucose 171 (H) 65 - 99 mg/dL    Bun 22 8 - 22 mg/dL    Creatinine 0.63 0.50 - 1.40 mg/dL    Calcium 9.2 8.4 - 10.2 mg/dL    AST(SGOT) 12 12 - 45 U/L    ALT(SGPT) 14 2 - 50 U/L    Alkaline Phosphatase 53 30 - 99 U/L    Total Bilirubin 0.2 0.1 - 1.5 mg/dL    Albumin 4.2 3.2 - 4.9 g/dL    Total Protein 6.9 6.0 - 8.2 g/dL    Globulin 2.7 1.9 - 3.5 g/dL    A-G Ratio 1.6 g/dL   LIPASE   Result Value Ref Range    Lipase 28 7 - 58 U/L   ESTIMATED GFR   Result Value Ref Range    GFR If African American >60 >60 mL/min/1.73 m 2    GFR If Non African American >60 >60 mL/min/1.73 m 2     Patient presents to the ER complaining of left-sided flank pain/low back pain over the last 4 days.  She says the pain radiates into the right low back and occasionally into the  left lower quadrant.  She experiences pain in the suprapubic area with urination.  She describes frequency and urgency of urination.  Here in the ER the patient does have moderate bacteria and some leukocyte in her urine.  However, there is also epithelial cells so this could be contaminant.  Urine culture is pending.  However, given her urinary symptoms, I will go ahead and treat her for possible UTI.  She said her pain felt  very similar to ureteral stone pain from the past.  Her last kidney stone was a year ago.  CT scan with renal colic protocol was negative for any ureteral stone.  No hydronephrosis.  No other dangerous intra-abdominal pathology identified.  She describes a little bit of pain radiating down the left leg from the back.  No pain beyond the knee.  No numbness, tingling weakness of extremities.  No loss of bowel or bladder control.  Neurologic exam is normal.  She has good strength.  At this time no concern for cord compression or cauda equina syndrome.  Patient has not had a fever.  There is no CVA tenderness.  She is not septic or toxic.  I do not think she has pyelonephritis.  White count is normal.  Vital signs are normal and stable.  She was given medications for pain with some improvement.  Patient will go home with Robaxin and Bactrim.  She has been told to use over-the-counter lidocaine or Salonpas patches for pain.  She is to use ice not heat.  I suspect the patient's back pain is likely musculoskeletal in etiology.  It does get worse with movements.  However, she may also have UTI.  This could also be contributing to some of the low back pain.  Patient is to follow-up with her primary care practitioner within the next 1 to 2 days.  She has been given strict return precautions and discharge instructions and she understands treatment plan and follow-up.      I verified that the patient was wearing a mask and I was wearing appropriate PPE every time I entered the room. The patient's mask was on the patient at all times during my encounter     FINAL IMPRESSION  1. Flank pain Acute    2. Acute UTI Acute       This dictation has been created using voice recognition software. The accuracy of the dictation is limited by the abilities of the software. I expect there may be some errors of grammar and possibly content. I made every attempt to manually correct the errors within my dictation. However, errors related to  voice recognition software may still exist and should be interpreted within the appropriate context.      Electronically signed by: Vanessa Martinez M.D., 6/27/2021 9:20 AM

## 2021-06-27 NOTE — ED NOTES
Rocephin hung; infusing at 200 ml;hr via pump; IV site patent.  Robaxin PO given.  Pt A&ox4, resp even & unlabored, speech clear.  Rates pain at 9/10

## 2021-06-27 NOTE — DISCHARGE INSTRUCTIONS
Return to the ER for any worsening back pain, changing back pain, worsening pain down the leg, numbness/tingling of your leg, weakness of your leg, loss of bowel or bladder control, blood in urine, worsening pain with urination, cloudy or foul-smelling urine, fevers over 100.4, shaking chills, nausea, vomiting, or for any concerns.      Use over-the-counter lidocaine patch or Salonpas patch to help with your discomfort.    Continue to take your Medrol Dosepak as prescribed.  The steroids will likely elevate your blood sugars, so please check your blood sugars regularly.    Follow-up with your endocrinologist this week as scheduled.    Use ice to help with the pain.

## 2021-06-29 LAB
BACTERIA UR CULT: NORMAL
SIGNIFICANT IND 70042: NORMAL
SITE SITE: NORMAL
SOURCE SOURCE: NORMAL

## 2021-11-26 DIAGNOSIS — E11.65 UNCONTROLLED TYPE 2 DIABETES MELLITUS WITH HYPERGLYCEMIA (HCC): ICD-10-CM

## 2021-11-29 RX ORDER — GLIMEPIRIDE 2 MG/1
TABLET ORAL
Qty: 90 TABLET | Refills: 0 | Status: SHIPPED | OUTPATIENT
Start: 2021-11-29 | End: 2023-12-26

## 2022-01-04 DIAGNOSIS — E11.65 UNCONTROLLED TYPE 2 DIABETES MELLITUS WITH HYPERGLYCEMIA (HCC): ICD-10-CM

## 2022-01-07 ENCOUNTER — TELEPHONE (OUTPATIENT)
Dept: ENDOCRINOLOGY | Facility: MEDICAL CENTER | Age: 57
End: 2022-01-07

## 2022-01-07 NOTE — TELEPHONE ENCOUNTER
Returned vm regarding appy and labs. LVM confirming when appt was and that I would mail out labs as requested

## 2023-02-24 ENCOUNTER — HOSPITAL ENCOUNTER (OUTPATIENT)
Dept: RADIOLOGY | Facility: MEDICAL CENTER | Age: 58
End: 2023-02-24
Attending: SPECIALIST
Payer: COMMERCIAL

## 2023-02-24 DIAGNOSIS — M48.061 SPINAL STENOSIS, LUMBAR REGION, WITHOUT NEUROGENIC CLAUDICATION: ICD-10-CM

## 2023-02-24 DIAGNOSIS — M54.16 LUMBAR RADICULOPATHY: ICD-10-CM

## 2023-02-24 DIAGNOSIS — M51.36 DEGENERATION OF LUMBAR INTERVERTEBRAL DISC: ICD-10-CM

## 2023-02-24 PROCEDURE — 72148 MRI LUMBAR SPINE W/O DYE: CPT

## 2023-05-16 ENCOUNTER — HOSPITAL ENCOUNTER (OUTPATIENT)
Facility: MEDICAL CENTER | Age: 58
End: 2023-05-16
Attending: NEUROLOGICAL SURGERY | Admitting: NEUROLOGICAL SURGERY
Payer: COMMERCIAL

## 2023-12-26 ENCOUNTER — OFFICE VISIT (OUTPATIENT)
Dept: URGENT CARE | Facility: PHYSICIAN GROUP | Age: 58
End: 2023-12-26
Payer: COMMERCIAL

## 2023-12-26 VITALS
SYSTOLIC BLOOD PRESSURE: 124 MMHG | TEMPERATURE: 97.9 F | HEART RATE: 64 BPM | DIASTOLIC BLOOD PRESSURE: 76 MMHG | RESPIRATION RATE: 16 BRPM | OXYGEN SATURATION: 98 % | WEIGHT: 196 LBS | HEIGHT: 63 IN | BODY MASS INDEX: 34.73 KG/M2

## 2023-12-26 DIAGNOSIS — J02.9 ACUTE SORE THROAT: ICD-10-CM

## 2023-12-26 DIAGNOSIS — J00 ACUTE NASOPHARYNGITIS: ICD-10-CM

## 2023-12-26 LAB — S PYO DNA SPEC NAA+PROBE: NOT DETECTED

## 2023-12-26 PROCEDURE — 87651 STREP A DNA AMP PROBE: CPT | Performed by: PHYSICIAN ASSISTANT

## 2023-12-26 PROCEDURE — 3074F SYST BP LT 130 MM HG: CPT | Performed by: PHYSICIAN ASSISTANT

## 2023-12-26 PROCEDURE — 3078F DIAST BP <80 MM HG: CPT | Performed by: PHYSICIAN ASSISTANT

## 2023-12-26 PROCEDURE — 99203 OFFICE O/P NEW LOW 30 MIN: CPT | Performed by: PHYSICIAN ASSISTANT

## 2023-12-26 RX ORDER — SEMAGLUTIDE 1.34 MG/ML
INJECTION, SOLUTION SUBCUTANEOUS
COMMUNITY
Start: 2023-12-08

## 2023-12-26 RX ORDER — FLUTICASONE PROPIONATE 50 MCG
1 SPRAY, SUSPENSION (ML) NASAL DAILY
Qty: 16 G | Refills: 0 | Status: SHIPPED | OUTPATIENT
Start: 2023-12-26

## 2023-12-26 RX ORDER — OMEPRAZOLE 40 MG/1
40 CAPSULE, DELAYED RELEASE ORAL DAILY
COMMUNITY
Start: 2023-12-23

## 2023-12-26 ASSESSMENT — ENCOUNTER SYMPTOMS
DIARRHEA: 0
HEADACHES: 0
EYE REDNESS: 0
EYE PAIN: 0
COUGH: 0
CONSTIPATION: 0
DIZZINESS: 0
NAUSEA: 0
EYE DISCHARGE: 0
SORE THROAT: 1
ABDOMINAL PAIN: 0
CHILLS: 0
FEVER: 0
VOMITING: 0
SINUS PAIN: 0
WHEEZING: 0
DIAPHORESIS: 0
SHORTNESS OF BREATH: 0

## 2023-12-26 NOTE — PROGRESS NOTES
"  Subjective:     Rosalba Méndez  is a 58 y.o. female who presents for Pharyngitis (X 2 days with nasal congestion.)       He presents today with pharyngitis symptoms have been ongoing over the past 1-2 days.  States that the symptoms are present throughout the day but are worsened at night.  Is having some nasal drainage.  Has been using cough drops and other over-the-counter medications for symptoms.  At this time she denies fever/chills/sweats, chest pain, shortness of breath, nausea/vomiting, abdominal pain, diarrhea.       Review of Systems   Constitutional:  Negative for chills, diaphoresis, fever and malaise/fatigue.   HENT:  Positive for congestion and sore throat. Negative for ear discharge and sinus pain.    Eyes:  Negative for pain, discharge and redness.   Respiratory:  Negative for cough, shortness of breath and wheezing.    Cardiovascular:  Negative for chest pain.   Gastrointestinal:  Negative for abdominal pain, constipation, diarrhea, nausea and vomiting.   Neurological:  Negative for dizziness and headaches.      No Known Allergies  Past Medical History:   Diagnosis Date    Diabetes     Hypertension         Objective:   /76 (BP Location: Left arm, Patient Position: Sitting, BP Cuff Size: Adult long)   Pulse 64   Temp 36.6 °C (97.9 °F) (Temporal)   Resp 16   Ht 1.6 m (5' 3\")   Wt 88.9 kg (196 lb)   SpO2 98%   BMI 34.72 kg/m²   Physical Exam  Vitals and nursing note reviewed.   Constitutional:       General: She is not in acute distress.     Appearance: Normal appearance. She is not ill-appearing, toxic-appearing or diaphoretic.   HENT:      Head: Normocephalic.      Right Ear: Tympanic membrane, ear canal and external ear normal. There is no impacted cerumen.      Left Ear: Tympanic membrane, ear canal and external ear normal. There is no impacted cerumen.      Nose: Rhinorrhea present. No congestion.      Mouth/Throat:      Mouth: Mucous membranes are moist.      Pharynx: Posterior " oropharyngeal erythema present. No oropharyngeal exudate.      Comments: No tonsillar swelling, bilaterally.  No soft tissue swelling of the sublingual mucosa, no swelling of the soft or hard palate, no unilarteral oral pharynx swelling, no uvular deviation.  Eyes:      General:         Right eye: No discharge.         Left eye: No discharge.      Conjunctiva/sclera: Conjunctivae normal.   Cardiovascular:      Rate and Rhythm: Normal rate and regular rhythm.   Pulmonary:      Effort: Pulmonary effort is normal. No respiratory distress.      Breath sounds: Normal breath sounds. No stridor. No wheezing or rhonchi.   Musculoskeletal:      Cervical back: Neck supple.   Lymphadenopathy:      Cervical: No cervical adenopathy.   Neurological:      General: No focal deficit present.      Mental Status: She is alert and oriented to person, place, and time.   Psychiatric:         Mood and Affect: Mood normal.         Behavior: Behavior normal.         Thought Content: Thought content normal.         Judgment: Judgment normal.             Diagnostic testing:    Socorrod Strep -negative, discussed during office visit    Assessment/Plan:     Encounter Diagnoses   Name Primary?    Acute nasopharyngitis     Acute sore throat           Plan for care for today's complaint includes starting the patient on Flonase for acute nasopharyngitis.  Strep test was negative today, no evidence of support antibiotic use this time as symptoms are likely viral in nature.  Use over-the-counter medications for additional symptom support.  Continue to monitor symptoms and return to urgent care or follow-up with primary care provider if symptoms remain ongoing.  Follow-up in the emergency department if symptoms become severe, ER precautions discussed in office today..  Prescription for Flonase provided.    See AVS Instructions below for written guidance provided to patient on after-visit management and care in addition to our verbal discussion during  the visit.    Please note that this dictation was created using voice recognition software. I have attempted to correct all errors, but there may be sound-alike, spelling, grammar and possibly content errors that I did not discover before finalizing the note.    Jose Francisco Richards PA-C

## 2024-05-10 ENCOUNTER — APPOINTMENT (OUTPATIENT)
Dept: RADIOLOGY | Facility: MEDICAL CENTER | Age: 59
End: 2024-05-10
Attending: NURSE PRACTITIONER
Payer: COMMERCIAL

## 2024-06-21 ENCOUNTER — APPOINTMENT (OUTPATIENT)
Dept: RADIOLOGY | Facility: MEDICAL CENTER | Age: 59
End: 2024-06-21
Attending: NURSE PRACTITIONER
Payer: COMMERCIAL

## 2024-06-27 ENCOUNTER — APPOINTMENT (OUTPATIENT)
Dept: RADIOLOGY | Facility: MEDICAL CENTER | Age: 59
End: 2024-06-27
Attending: NURSE PRACTITIONER
Payer: COMMERCIAL

## 2024-06-27 DIAGNOSIS — M48.8X6 OTHER SPECIFIED SPONDYLOPATHIES, LUMBAR REGION (HCC): ICD-10-CM

## 2024-06-27 PROCEDURE — 72110 X-RAY EXAM L-2 SPINE 4/>VWS: CPT

## 2024-06-27 PROCEDURE — 72148 MRI LUMBAR SPINE W/O DYE: CPT
